# Patient Record
Sex: FEMALE | Race: WHITE | Employment: OTHER | URBAN - METROPOLITAN AREA
[De-identification: names, ages, dates, MRNs, and addresses within clinical notes are randomized per-mention and may not be internally consistent; named-entity substitution may affect disease eponyms.]

---

## 2017-04-10 RX ORDER — ATORVASTATIN CALCIUM 20 MG/1
TABLET, FILM COATED ORAL
Qty: 30 TABLET | Refills: 0 | Status: SHIPPED | OUTPATIENT
Start: 2017-04-10 | End: 2017-05-10 | Stop reason: SDUPTHER

## 2017-05-10 RX ORDER — ATORVASTATIN CALCIUM 20 MG/1
TABLET, FILM COATED ORAL
Qty: 30 TABLET | Refills: 2 | Status: SHIPPED | OUTPATIENT
Start: 2017-05-10 | End: 2017-08-28 | Stop reason: SDUPTHER

## 2017-06-19 PROBLEM — G43.719 INTRACTABLE CHRONIC MIGRAINE WITHOUT AURA AND WITHOUT STATUS MIGRAINOSUS: Status: ACTIVE | Noted: 2017-06-19

## 2017-06-19 PROBLEM — M54.2 CERVICALGIA: Status: ACTIVE | Noted: 2017-06-19

## 2017-07-13 ENCOUNTER — OFFICE VISIT (OUTPATIENT)
Dept: FAMILY MEDICINE CLINIC | Age: 67
End: 2017-07-13

## 2017-07-13 VITALS
DIASTOLIC BLOOD PRESSURE: 80 MMHG | BODY MASS INDEX: 24.48 KG/M2 | HEART RATE: 73 BPM | SYSTOLIC BLOOD PRESSURE: 112 MMHG | WEIGHT: 142.6 LBS | OXYGEN SATURATION: 98 %

## 2017-07-13 DIAGNOSIS — Z11.59 NEED FOR HEPATITIS C SCREENING TEST: ICD-10-CM

## 2017-07-13 DIAGNOSIS — M54.2 NECK PAIN: Primary | ICD-10-CM

## 2017-07-13 PROCEDURE — 3014F SCREEN MAMMO DOC REV: CPT | Performed by: FAMILY MEDICINE

## 2017-07-13 PROCEDURE — 1036F TOBACCO NON-USER: CPT | Performed by: FAMILY MEDICINE

## 2017-07-13 PROCEDURE — 99213 OFFICE O/P EST LOW 20 MIN: CPT | Performed by: FAMILY MEDICINE

## 2017-07-13 PROCEDURE — G8399 PT W/DXA RESULTS DOCUMENT: HCPCS | Performed by: FAMILY MEDICINE

## 2017-07-13 PROCEDURE — G8420 CALC BMI NORM PARAMETERS: HCPCS | Performed by: FAMILY MEDICINE

## 2017-07-13 PROCEDURE — 4040F PNEUMOC VAC/ADMIN/RCVD: CPT | Performed by: FAMILY MEDICINE

## 2017-07-13 PROCEDURE — G8427 DOCREV CUR MEDS BY ELIG CLIN: HCPCS | Performed by: FAMILY MEDICINE

## 2017-07-13 PROCEDURE — 3017F COLORECTAL CA SCREEN DOC REV: CPT | Performed by: FAMILY MEDICINE

## 2017-07-13 PROCEDURE — 1123F ACP DISCUSS/DSCN MKR DOCD: CPT | Performed by: FAMILY MEDICINE

## 2017-07-13 PROCEDURE — 1090F PRES/ABSN URINE INCON ASSESS: CPT | Performed by: FAMILY MEDICINE

## 2017-07-13 ASSESSMENT — PATIENT HEALTH QUESTIONNAIRE - PHQ9
SUM OF ALL RESPONSES TO PHQ QUESTIONS 1-9: 1
SUM OF ALL RESPONSES TO PHQ9 QUESTIONS 1 & 2: 1
1. LITTLE INTEREST OR PLEASURE IN DOING THINGS: 0
2. FEELING DOWN, DEPRESSED OR HOPELESS: 1

## 2017-07-14 ENCOUNTER — HOSPITAL ENCOUNTER (OUTPATIENT)
Dept: OTHER | Age: 67
Discharge: HOME | End: 2017-07-14
Attending: FAMILY MEDICINE | Admitting: FAMILY MEDICINE

## 2017-07-14 DIAGNOSIS — M54.2 NECK PAIN: ICD-10-CM

## 2017-07-14 LAB — HEPATITIS C ANTIBODY INTERPRETATION: NORMAL

## 2017-07-22 ENCOUNTER — HOSPITAL ENCOUNTER (OUTPATIENT)
Dept: MRI IMAGING | Age: 67
Discharge: HOME | End: 2017-07-22
Attending: ORTHOPAEDIC SURGERY | Admitting: ORTHOPAEDIC SURGERY

## 2017-07-22 DIAGNOSIS — M48.02 CERVICAL SPINAL STENOSIS: ICD-10-CM

## 2017-07-22 DIAGNOSIS — M50.30 DDD (DEGENERATIVE DISC DISEASE), CERVICAL: ICD-10-CM

## 2017-07-22 DIAGNOSIS — M50.30 OTHER CERVICAL DISC DEGENERATION, UNSPECIFIED CERVICAL REGION: ICD-10-CM

## 2017-08-14 DIAGNOSIS — E03.9 HYPOTHYROIDISM, UNSPECIFIED TYPE: ICD-10-CM

## 2017-08-15 RX ORDER — ATORVASTATIN CALCIUM 20 MG/1
TABLET, FILM COATED ORAL
Qty: 30 TABLET | Refills: 0 | OUTPATIENT
Start: 2017-08-15

## 2017-08-15 RX ORDER — LEVOTHYROXINE SODIUM 0.05 MG/1
TABLET ORAL
Qty: 90 TABLET | Refills: 0 | OUTPATIENT
Start: 2017-08-15

## 2017-08-16 ENCOUNTER — TELEPHONE (OUTPATIENT)
Dept: FAMILY MEDICINE CLINIC | Age: 67
End: 2017-08-16

## 2017-08-16 DIAGNOSIS — E03.9 HYPOTHYROIDISM, UNSPECIFIED TYPE: ICD-10-CM

## 2017-08-16 RX ORDER — LEVOTHYROXINE SODIUM 0.05 MG/1
TABLET ORAL
Qty: 30 TABLET | Refills: 0 | Status: SHIPPED | OUTPATIENT
Start: 2017-08-16 | End: 2017-08-28 | Stop reason: SDUPTHER

## 2017-08-28 ENCOUNTER — OFFICE VISIT (OUTPATIENT)
Dept: FAMILY MEDICINE CLINIC | Age: 67
End: 2017-08-28

## 2017-08-28 VITALS
SYSTOLIC BLOOD PRESSURE: 110 MMHG | HEART RATE: 75 BPM | OXYGEN SATURATION: 98 % | WEIGHT: 144.2 LBS | DIASTOLIC BLOOD PRESSURE: 72 MMHG | BODY MASS INDEX: 24.75 KG/M2

## 2017-08-28 DIAGNOSIS — G43.909 MIGRAINE WITHOUT STATUS MIGRAINOSUS, NOT INTRACTABLE, UNSPECIFIED MIGRAINE TYPE: ICD-10-CM

## 2017-08-28 DIAGNOSIS — E78.5 HYPERLIPIDEMIA, UNSPECIFIED HYPERLIPIDEMIA TYPE: Primary | ICD-10-CM

## 2017-08-28 DIAGNOSIS — E03.9 HYPOTHYROIDISM, UNSPECIFIED TYPE: ICD-10-CM

## 2017-08-28 PROCEDURE — G8420 CALC BMI NORM PARAMETERS: HCPCS | Performed by: FAMILY MEDICINE

## 2017-08-28 PROCEDURE — 4040F PNEUMOC VAC/ADMIN/RCVD: CPT | Performed by: FAMILY MEDICINE

## 2017-08-28 PROCEDURE — 3017F COLORECTAL CA SCREEN DOC REV: CPT | Performed by: FAMILY MEDICINE

## 2017-08-28 PROCEDURE — 99214 OFFICE O/P EST MOD 30 MIN: CPT | Performed by: FAMILY MEDICINE

## 2017-08-28 PROCEDURE — 1123F ACP DISCUSS/DSCN MKR DOCD: CPT | Performed by: FAMILY MEDICINE

## 2017-08-28 PROCEDURE — 1090F PRES/ABSN URINE INCON ASSESS: CPT | Performed by: FAMILY MEDICINE

## 2017-08-28 PROCEDURE — G8427 DOCREV CUR MEDS BY ELIG CLIN: HCPCS | Performed by: FAMILY MEDICINE

## 2017-08-28 PROCEDURE — G8399 PT W/DXA RESULTS DOCUMENT: HCPCS | Performed by: FAMILY MEDICINE

## 2017-08-28 PROCEDURE — 1036F TOBACCO NON-USER: CPT | Performed by: FAMILY MEDICINE

## 2017-08-28 PROCEDURE — 3014F SCREEN MAMMO DOC REV: CPT | Performed by: FAMILY MEDICINE

## 2017-08-28 RX ORDER — ATORVASTATIN CALCIUM 20 MG/1
TABLET, FILM COATED ORAL
Qty: 90 TABLET | Refills: 3 | Status: SHIPPED | OUTPATIENT
Start: 2017-08-28 | End: 2018-09-16 | Stop reason: SDUPTHER

## 2017-08-28 RX ORDER — SUMATRIPTAN 100 MG/1
TABLET, FILM COATED ORAL
Qty: 9 TABLET | Refills: 11 | Status: SHIPPED | OUTPATIENT
Start: 2017-08-28

## 2017-08-28 RX ORDER — TIZANIDINE 2 MG/1
TABLET ORAL
Refills: 0 | COMMUNITY
Start: 2017-08-16 | End: 2018-07-21 | Stop reason: ALTCHOICE

## 2017-08-28 RX ORDER — LEVOTHYROXINE SODIUM 0.05 MG/1
TABLET ORAL
Qty: 90 TABLET | Refills: 3 | Status: SHIPPED | OUTPATIENT
Start: 2017-08-28 | End: 2018-08-22 | Stop reason: SDUPTHER

## 2017-08-29 ENCOUNTER — HOSPITAL ENCOUNTER (OUTPATIENT)
Dept: OTHER | Age: 67
Discharge: HOME | End: 2017-08-29
Attending: FAMILY MEDICINE | Admitting: FAMILY MEDICINE

## 2017-08-29 LAB
A/G RATIO: 1.6 (ref 1.1–2.2)
ALBUMIN SERPL-MCNC: 4.2 G/DL (ref 3.4–5)
ALP BLD-CCNC: 85 U/L (ref 40–129)
ALT SERPL-CCNC: 15 U/L (ref 10–40)
ANION GAP SERPL CALCULATED.3IONS-SCNC: 13 MMOL/L (ref 3–16)
AST SERPL-CCNC: 18 U/L (ref 15–37)
BILIRUB SERPL-MCNC: 0.4 MG/DL (ref 0–1)
BUN BLDV-MCNC: 13 MG/DL (ref 7–20)
CALCIUM SERPL-MCNC: 9 MG/DL (ref 8.3–10.6)
CHLORIDE BLD-SCNC: 107 MMOL/L (ref 99–110)
CHOLESTEROL, TOTAL: 182 MG/DL (ref 0–199)
CO2: 22 MMOL/L (ref 21–32)
CREAT SERPL-MCNC: 0.7 MG/DL (ref 0.6–1.2)
GFR AFRICAN AMERICAN: >60
GFR NON-AFRICAN AMERICAN: >60
GLOBULIN: 2.6 G/DL
GLUCOSE BLD-MCNC: 94 MG/DL (ref 70–99)
HDLC SERPL-MCNC: 54 MG/DL (ref 40–60)
LDL CHOLESTEROL CALCULATED: 109 MG/DL
POTASSIUM SERPL-SCNC: 4.1 MMOL/L (ref 3.5–5.1)
SODIUM BLD-SCNC: 142 MMOL/L (ref 136–145)
TOTAL PROTEIN: 6.8 G/DL (ref 6.4–8.2)
TRIGL SERPL-MCNC: 94 MG/DL (ref 0–150)
TSH SERPL DL<=0.05 MIU/L-ACNC: 0.94 UIU/ML (ref 0.27–4.2)
VLDLC SERPL CALC-MCNC: 19 MG/DL

## 2017-11-05 DIAGNOSIS — N95.9 POSTMENOPAUSAL SYMPTOMS: ICD-10-CM

## 2017-11-06 RX ORDER — CONJUGATED ESTROGENS 0.3 MG/1
TABLET, FILM COATED ORAL
Qty: 24 TABLET | Refills: 0 | Status: SHIPPED | OUTPATIENT
Start: 2017-11-06 | End: 2018-01-28 | Stop reason: SDUPTHER

## 2018-01-28 DIAGNOSIS — N95.9 POSTMENOPAUSAL SYMPTOMS: ICD-10-CM

## 2018-01-31 RX ORDER — CONJUGATED ESTROGENS 0.3 MG/1
TABLET, FILM COATED ORAL
Qty: 24 TABLET | Refills: 0 | Status: SHIPPED | OUTPATIENT
Start: 2018-01-31 | End: 2018-04-24 | Stop reason: SDUPTHER

## 2018-04-24 DIAGNOSIS — N95.9 POSTMENOPAUSAL SYMPTOMS: ICD-10-CM

## 2018-04-25 RX ORDER — CONJUGATED ESTROGENS 0.3 MG/1
TABLET, FILM COATED ORAL
Qty: 24 TABLET | Refills: 0 | Status: SHIPPED | OUTPATIENT
Start: 2018-04-25 | End: 2018-07-07 | Stop reason: SDUPTHER

## 2018-05-10 ENCOUNTER — TELEPHONE (OUTPATIENT)
Dept: FAMILY MEDICINE CLINIC | Age: 68
End: 2018-05-10

## 2018-07-02 DIAGNOSIS — J45.20 MILD INTERMITTENT ASTHMA WITHOUT COMPLICATION: ICD-10-CM

## 2018-07-02 RX ORDER — ALBUTEROL SULFATE 90 UG/1
2 AEROSOL, METERED RESPIRATORY (INHALATION) EVERY 6 HOURS PRN
Qty: 1 INHALER | Refills: 0 | Status: SHIPPED | OUTPATIENT
Start: 2018-07-02 | End: 2018-08-12 | Stop reason: SDUPTHER

## 2018-07-03 ENCOUNTER — TELEPHONE (OUTPATIENT)
Dept: FAMILY MEDICINE CLINIC | Age: 68
End: 2018-07-03

## 2018-07-03 NOTE — TELEPHONE ENCOUNTER
Pt called to schedule an appt for follow up thyroid/ med check per message from the pharmacy. Please call Pt to discuss scheduling with Dr. Soto Buitrago.

## 2018-07-07 DIAGNOSIS — N95.9 POSTMENOPAUSAL SYMPTOMS: ICD-10-CM

## 2018-07-07 RX ORDER — CONJUGATED ESTROGENS 0.3 MG/1
TABLET, FILM COATED ORAL
Qty: 24 TABLET | Refills: 0 | Status: SHIPPED | OUTPATIENT
Start: 2018-07-07 | End: 2018-10-05 | Stop reason: SDUPTHER

## 2018-07-21 ENCOUNTER — HOSPITAL ENCOUNTER (OUTPATIENT)
Dept: OTHER | Age: 68
Discharge: HOME | End: 2018-07-21
Attending: FAMILY MEDICINE | Admitting: FAMILY MEDICINE

## 2018-07-21 ENCOUNTER — OFFICE VISIT (OUTPATIENT)
Dept: FAMILY MEDICINE CLINIC | Age: 68
End: 2018-07-21

## 2018-07-21 VITALS
BODY MASS INDEX: 23.72 KG/M2 | HEART RATE: 85 BPM | OXYGEN SATURATION: 97 % | DIASTOLIC BLOOD PRESSURE: 62 MMHG | WEIGHT: 142.4 LBS | SYSTOLIC BLOOD PRESSURE: 96 MMHG | HEIGHT: 65 IN

## 2018-07-21 DIAGNOSIS — Z12.39 SCREENING FOR BREAST CANCER: ICD-10-CM

## 2018-07-21 DIAGNOSIS — E03.9 HYPOTHYROIDISM, UNSPECIFIED TYPE: ICD-10-CM

## 2018-07-21 DIAGNOSIS — E04.2 MULTIPLE THYROID NODULES: ICD-10-CM

## 2018-07-21 DIAGNOSIS — J45.909 UNCOMPLICATED ASTHMA, UNSPECIFIED ASTHMA SEVERITY, UNSPECIFIED WHETHER PERSISTENT: ICD-10-CM

## 2018-07-21 DIAGNOSIS — E03.9 HYPOTHYROIDISM, UNSPECIFIED TYPE: Primary | ICD-10-CM

## 2018-07-21 DIAGNOSIS — E78.5 HYPERLIPIDEMIA, UNSPECIFIED HYPERLIPIDEMIA TYPE: ICD-10-CM

## 2018-07-21 DIAGNOSIS — M85.80 OSTEOPENIA, UNSPECIFIED LOCATION: ICD-10-CM

## 2018-07-21 LAB
A/G RATIO: 1.7 (ref 1.1–2.2)
ALBUMIN SERPL-MCNC: 4.5 G/DL (ref 3.4–5)
ALP BLD-CCNC: 75 U/L (ref 40–129)
ALT SERPL-CCNC: 13 U/L (ref 10–40)
ANION GAP SERPL CALCULATED.3IONS-SCNC: 12 MMOL/L (ref 3–16)
AST SERPL-CCNC: 19 U/L (ref 15–37)
BILIRUB SERPL-MCNC: 0.5 MG/DL (ref 0–1)
BUN BLDV-MCNC: 13 MG/DL (ref 7–20)
CALCIUM SERPL-MCNC: 9.5 MG/DL (ref 8.3–10.6)
CHLORIDE BLD-SCNC: 105 MMOL/L (ref 99–110)
CO2: 24 MMOL/L (ref 21–32)
CREAT SERPL-MCNC: 0.7 MG/DL (ref 0.6–1.2)
GFR AFRICAN AMERICAN: >60
GFR NON-AFRICAN AMERICAN: >60
GLOBULIN: 2.7 G/DL
GLUCOSE BLD-MCNC: 91 MG/DL (ref 70–99)
POTASSIUM SERPL-SCNC: 4.4 MMOL/L (ref 3.5–5.1)
SODIUM BLD-SCNC: 141 MMOL/L (ref 136–145)
TOTAL PROTEIN: 7.2 G/DL (ref 6.4–8.2)
TSH REFLEX: 0.84 UIU/ML (ref 0.27–4.2)

## 2018-07-21 PROCEDURE — G8399 PT W/DXA RESULTS DOCUMENT: HCPCS | Performed by: FAMILY MEDICINE

## 2018-07-21 PROCEDURE — 1036F TOBACCO NON-USER: CPT | Performed by: FAMILY MEDICINE

## 2018-07-21 PROCEDURE — G8427 DOCREV CUR MEDS BY ELIG CLIN: HCPCS | Performed by: FAMILY MEDICINE

## 2018-07-21 PROCEDURE — 3017F COLORECTAL CA SCREEN DOC REV: CPT | Performed by: FAMILY MEDICINE

## 2018-07-21 PROCEDURE — 3288F FALL RISK ASSESSMENT DOCD: CPT | Performed by: FAMILY MEDICINE

## 2018-07-21 PROCEDURE — 99214 OFFICE O/P EST MOD 30 MIN: CPT | Performed by: FAMILY MEDICINE

## 2018-07-21 PROCEDURE — 1123F ACP DISCUSS/DSCN MKR DOCD: CPT | Performed by: FAMILY MEDICINE

## 2018-07-21 PROCEDURE — 1090F PRES/ABSN URINE INCON ASSESS: CPT | Performed by: FAMILY MEDICINE

## 2018-07-21 PROCEDURE — G8510 SCR DEP NEG, NO PLAN REQD: HCPCS | Performed by: FAMILY MEDICINE

## 2018-07-21 PROCEDURE — G8420 CALC BMI NORM PARAMETERS: HCPCS | Performed by: FAMILY MEDICINE

## 2018-07-21 PROCEDURE — 4040F PNEUMOC VAC/ADMIN/RCVD: CPT | Performed by: FAMILY MEDICINE

## 2018-07-21 PROCEDURE — 1101F PT FALLS ASSESS-DOCD LE1/YR: CPT | Performed by: FAMILY MEDICINE

## 2018-07-21 RX ORDER — FLUTICASONE PROPIONATE 220 UG/1
1 AEROSOL, METERED RESPIRATORY (INHALATION) 2 TIMES DAILY
Qty: 1 INHALER | Refills: 3 | Status: SHIPPED | OUTPATIENT
Start: 2018-07-21

## 2018-07-21 ASSESSMENT — PATIENT HEALTH QUESTIONNAIRE - PHQ9
1. LITTLE INTEREST OR PLEASURE IN DOING THINGS: 0
SUM OF ALL RESPONSES TO PHQ QUESTIONS 1-9: 0
SUM OF ALL RESPONSES TO PHQ9 QUESTIONS 1 & 2: 0
2. FEELING DOWN, DEPRESSED OR HOPELESS: 0

## 2018-07-21 NOTE — PROGRESS NOTES
thyroid nodules     6. Uncomplicated asthma, unspecified asthma severity, unspecified whether persistent  fluticasone (FLOVENT HFA) 220 MCG/ACT inhaler     Plan:   Check TSH due to history of hypothyroidism. For now, continue levothyroxine at current dose. Check CMP as patient is on statin for lipid control. Repeat liver panel not currently necessary. Bone density scan ordered for history of osteopenia. Mammogram order written. Will see ENT for thyroid nodule follow up. Add Flovent one puff twice a day for asthma which is not well controlled. Hopefully this will help to decrease reliance on albuterol medication. Let us know if she continues to use albuterol more than once or twice a week. Declines shingrix and pneumonia vaccinations. Up-to-date on colonoscopy until 9/2021.

## 2018-08-12 DIAGNOSIS — J45.20 MILD INTERMITTENT ASTHMA WITHOUT COMPLICATION: ICD-10-CM

## 2018-08-17 ENCOUNTER — TELEPHONE (OUTPATIENT)
Dept: FAMILY MEDICINE CLINIC | Age: 68
End: 2018-08-17

## 2018-08-17 DIAGNOSIS — Z78.0 POSTMENOPAUSAL: Primary | ICD-10-CM

## 2018-08-17 NOTE — TELEPHONE ENCOUNTER
[facility] Scheduling state that the ICD 10 code for Dexa Scan does not pass medical necessity for Medicare. Needs a new order w/new code. Please advise     Advised Dr. Atlee Bumpers is out of office today. No appt can be scheduled until new order is sent.

## 2018-08-18 ENCOUNTER — HOSPITAL ENCOUNTER (OUTPATIENT)
Dept: MAMMOGRAPHY | Age: 68
Discharge: HOME | End: 2018-08-18
Attending: FAMILY MEDICINE | Admitting: FAMILY MEDICINE

## 2018-08-18 DIAGNOSIS — Z12.39 SCREENING FOR BREAST CANCER: ICD-10-CM

## 2018-08-18 DIAGNOSIS — M85.80 OTHER SPECIFIED DISORDERS OF BONE DENSITY AND STRUCTURE, UNSPECIFIED SITE: ICD-10-CM

## 2018-08-22 DIAGNOSIS — E03.9 HYPOTHYROIDISM, UNSPECIFIED TYPE: ICD-10-CM

## 2018-08-22 RX ORDER — LEVOTHYROXINE SODIUM 0.05 MG/1
TABLET ORAL
Qty: 90 TABLET | Refills: 0 | Status: SHIPPED | OUTPATIENT
Start: 2018-08-22 | End: 2018-12-01 | Stop reason: SDUPTHER

## 2018-09-04 ENCOUNTER — HOSPITAL ENCOUNTER (OUTPATIENT)
Dept: GENERAL RADIOLOGY | Age: 68
Discharge: HOME | End: 2018-09-04
Attending: FAMILY MEDICINE | Admitting: FAMILY MEDICINE

## 2018-09-04 DIAGNOSIS — Z78.0 ASYMPTOMATIC MENOPAUSAL STATE: ICD-10-CM

## 2018-09-04 DIAGNOSIS — Z78.0 POSTMENOPAUSAL: ICD-10-CM

## 2018-09-17 ENCOUNTER — PATIENT MESSAGE (OUTPATIENT)
Dept: FAMILY MEDICINE CLINIC | Age: 68
End: 2018-09-17

## 2018-09-17 RX ORDER — ATORVASTATIN CALCIUM 20 MG/1
TABLET, FILM COATED ORAL
Qty: 90 TABLET | Refills: 0 | Status: SHIPPED | OUTPATIENT
Start: 2018-09-17 | End: 2018-12-21 | Stop reason: SDUPTHER

## 2018-09-19 NOTE — TELEPHONE ENCOUNTER
From: Brian Anguiano  To: Aline Martin MD  Sent: 9/17/2018 5:17 PM EDT  Subject: Non-Urgent Medical Question    Dr. Michael Plascencia  This is not a question but wanted you to know I have contacted [facility] to have my Elex Downy filled through them since I cannot afford to pay to have it filled at [facility]. (Just like the previous inhaler we cancelled.) I am having my Premarin fill through them, also. They will be getting in touch with you regarding these two prescriptions. Have a good week!   Tonie Perdomo

## 2018-10-05 DIAGNOSIS — N95.9 POSTMENOPAUSAL SYMPTOMS: ICD-10-CM

## 2018-10-09 ENCOUNTER — TELEPHONE (OUTPATIENT)
Dept: FAMILY MEDICINE CLINIC | Age: 68
End: 2018-10-09

## 2018-12-01 DIAGNOSIS — E03.9 HYPOTHYROIDISM, UNSPECIFIED TYPE: ICD-10-CM

## 2018-12-03 RX ORDER — LEVOTHYROXINE SODIUM 0.05 MG/1
TABLET ORAL
Qty: 90 TABLET | Refills: 0 | Status: SHIPPED | OUTPATIENT
Start: 2018-12-03 | End: 2019-02-25 | Stop reason: SDUPTHER

## 2018-12-21 RX ORDER — ATORVASTATIN CALCIUM 20 MG/1
TABLET, FILM COATED ORAL
Qty: 90 TABLET | Refills: 1 | Status: SHIPPED | OUTPATIENT
Start: 2018-12-21 | End: 2019-06-13 | Stop reason: SDUPTHER

## 2019-02-25 DIAGNOSIS — E03.9 HYPOTHYROIDISM, UNSPECIFIED TYPE: ICD-10-CM

## 2019-02-26 RX ORDER — LEVOTHYROXINE SODIUM 0.05 MG/1
TABLET ORAL
Qty: 90 TABLET | Refills: 1 | Status: SHIPPED | OUTPATIENT
Start: 2019-02-26

## 2019-05-07 ENCOUNTER — OFFICE VISIT (OUTPATIENT)
Dept: FAMILY MEDICINE CLINIC | Age: 69
End: 2019-05-07
Payer: MEDICARE

## 2019-05-07 VITALS
SYSTOLIC BLOOD PRESSURE: 100 MMHG | TEMPERATURE: 98.6 F | OXYGEN SATURATION: 98 % | BODY MASS INDEX: 24.65 KG/M2 | DIASTOLIC BLOOD PRESSURE: 70 MMHG | HEART RATE: 88 BPM | WEIGHT: 147 LBS

## 2019-05-07 DIAGNOSIS — J37.0 CHRONIC LARYNGITIS: Primary | ICD-10-CM

## 2019-05-07 DIAGNOSIS — R13.10 DYSPHAGIA, UNSPECIFIED TYPE: ICD-10-CM

## 2019-05-07 DIAGNOSIS — H65.05 RECURRENT ACUTE SEROUS OTITIS MEDIA OF LEFT EAR: ICD-10-CM

## 2019-05-07 DIAGNOSIS — H72.92 PERFORATION OF LEFT TYMPANIC MEMBRANE: ICD-10-CM

## 2019-05-07 PROCEDURE — G8427 DOCREV CUR MEDS BY ELIG CLIN: HCPCS | Performed by: PHYSICIAN ASSISTANT

## 2019-05-07 PROCEDURE — 1036F TOBACCO NON-USER: CPT | Performed by: PHYSICIAN ASSISTANT

## 2019-05-07 PROCEDURE — 3017F COLORECTAL CA SCREEN DOC REV: CPT | Performed by: PHYSICIAN ASSISTANT

## 2019-05-07 PROCEDURE — 99214 OFFICE O/P EST MOD 30 MIN: CPT | Performed by: PHYSICIAN ASSISTANT

## 2019-05-07 PROCEDURE — G8399 PT W/DXA RESULTS DOCUMENT: HCPCS | Performed by: PHYSICIAN ASSISTANT

## 2019-05-07 PROCEDURE — G8420 CALC BMI NORM PARAMETERS: HCPCS | Performed by: PHYSICIAN ASSISTANT

## 2019-05-07 PROCEDURE — 1123F ACP DISCUSS/DSCN MKR DOCD: CPT | Performed by: PHYSICIAN ASSISTANT

## 2019-05-07 PROCEDURE — 4040F PNEUMOC VAC/ADMIN/RCVD: CPT | Performed by: PHYSICIAN ASSISTANT

## 2019-05-07 PROCEDURE — 1090F PRES/ABSN URINE INCON ASSESS: CPT | Performed by: PHYSICIAN ASSISTANT

## 2019-05-07 RX ORDER — AZITHROMYCIN 250 MG/1
TABLET, FILM COATED ORAL
Qty: 1 PACKET | Refills: 0 | Status: SHIPPED | OUTPATIENT
Start: 2019-05-07 | End: 2020-05-15

## 2019-05-07 ASSESSMENT — ENCOUNTER SYMPTOMS
VOICE CHANGE: 1
EYE PAIN: 0
TROUBLE SWALLOWING: 1
NAUSEA: 0
COUGH: 0
SORE THROAT: 0
WHEEZING: 0
VOMITING: 0
RHINORRHEA: 0
DIARRHEA: 0

## 2019-05-07 ASSESSMENT — PATIENT HEALTH QUESTIONNAIRE - PHQ9
SUM OF ALL RESPONSES TO PHQ QUESTIONS 1-9: 0
2. FEELING DOWN, DEPRESSED OR HOPELESS: 0
1. LITTLE INTEREST OR PLEASURE IN DOING THINGS: 0
SUM OF ALL RESPONSES TO PHQ9 QUESTIONS 1 & 2: 0
SUM OF ALL RESPONSES TO PHQ QUESTIONS 1-9: 0

## 2019-05-07 NOTE — PATIENT INSTRUCTIONS
ENT Specialists:    Dr. Cynthia Dawn  [phone]  [address]    Dr. Erica Garcia  [address] [facility], [address]    Dr. Denese Lesch Dr. Caye Rist  1 [facility] [address] [facility], . [address]   [phone]    Inezcam Estrada, [address], MD  [facility]  [phone] [facility])  [phone] ([place] [address])    1 [facility])  [address] [facility] [address]  [facility], [address]   Phone: [phone]        Antonio Officer was seen today for congestion, cough and neck pain. Diagnoses and all orders for this visit:    Chronic laryngitis    Recurrent acute serous otitis media of left ear  -     azithromycin (ZITHROMAX) 250 MG tablet; Take 2 tabs po today then 1 daily x 4 days    Perforation of left tympanic membrane    Dysphagia, unspecified type       See ENT for laryngitis, chronic otitis serous and dysphagia.

## 2019-05-07 NOTE — PROGRESS NOTES
Subjective:      Patient ID: Stephen Gregorio is a 76 y.o. female. HPI Patient is here today with at least a 1-2 month history of laryngitis. No post nasal drip, cough, ST. She does not feel sickly. She does also feel like her neck is swollen where her thyroid is. She had 3/4 of her thyroid removed in 2010 by Dr. Justin Hebert. She is not sure he takes her insurance anymore. She had multiple nodules. They were not cancerous. She has choked twice on food lately. She has a difficult time swallowing everything. She has a perforated left TM and always feels like there is something in there. Review of Systems   Constitutional: Negative for appetite change, chills, fatigue and fever. HENT: Positive for ear pain, hearing loss, trouble swallowing and voice change. Negative for congestion, postnasal drip, rhinorrhea and sore throat. Eyes: Negative for pain. Respiratory: Negative for cough and wheezing. Gastrointestinal: Negative for diarrhea, nausea and vomiting. Musculoskeletal: Negative for myalgias. Skin: Negative for rash. Neurological: Negative for dizziness and headaches. Psychiatric/Behavioral: Negative for sleep disturbance. Objective:   Physical Exam   Constitutional: She is oriented to person, place, and time. Vital signs are normal. She appears well-developed and well-nourished. She is cooperative. HENT:   Head: Normocephalic. Right Ear: Tympanic membrane and ear canal normal.   Left Ear: Ear canal normal.   Nose: Mucosal edema and rhinorrhea present. Right sinus exhibits no maxillary sinus tenderness and no frontal sinus tenderness. Left sinus exhibits no maxillary sinus tenderness and no frontal sinus tenderness. Mouth/Throat: Oropharynx is clear and moist and mucous membranes are normal.   Left TM perforation, slightly erythemetous, fluid behind left TM   Neck: Neck supple. No thyromegaly present. Cardiovascular: Normal rate, regular rhythm and normal heart sounds. Pulmonary/Chest: Effort normal and breath sounds normal. No respiratory distress. She has no decreased breath sounds. She has no wheezes. She has no rhonchi. She has no rales. Lymphadenopathy:     She has no cervical adenopathy. Neurological: She is alert and oriented to person, place, and time. Assessment / Plan:         Kenia Cancino was seen today for congestion, cough and neck pain. Diagnoses and all orders for this visit:    Chronic laryngitis    Recurrent acute serous otitis media of left ear  -     azithromycin (ZITHROMAX) 250 MG tablet; Take 2 tabs po today then 1 daily x 4 days    Perforation of left tympanic membrane    Dysphagia, unspecified type       Treat otitis serous, see ENT for thyroid and laryngitis.

## 2019-05-20 ENCOUNTER — OFFICE VISIT (OUTPATIENT)
Dept: ENT CLINIC | Age: 69
End: 2019-05-20
Payer: MEDICARE

## 2019-05-20 VITALS
HEART RATE: 72 BPM | WEIGHT: 146 LBS | BODY MASS INDEX: 24.48 KG/M2 | DIASTOLIC BLOOD PRESSURE: 66 MMHG | SYSTOLIC BLOOD PRESSURE: 111 MMHG

## 2019-05-20 DIAGNOSIS — J30.9 ALLERGIC RHINITIS, UNSPECIFIED SEASONALITY, UNSPECIFIED TRIGGER: ICD-10-CM

## 2019-05-20 DIAGNOSIS — E04.1 THYROID NODULE: ICD-10-CM

## 2019-05-20 DIAGNOSIS — R49.0 HOARSENESS OF VOICE: Primary | ICD-10-CM

## 2019-05-20 DIAGNOSIS — H93.13 SUBJECTIVE TINNITUS OF BOTH EARS: ICD-10-CM

## 2019-05-20 DIAGNOSIS — E04.9 GOITER: ICD-10-CM

## 2019-05-20 PROCEDURE — 1090F PRES/ABSN URINE INCON ASSESS: CPT | Performed by: OTOLARYNGOLOGY

## 2019-05-20 PROCEDURE — G8399 PT W/DXA RESULTS DOCUMENT: HCPCS | Performed by: OTOLARYNGOLOGY

## 2019-05-20 PROCEDURE — G8420 CALC BMI NORM PARAMETERS: HCPCS | Performed by: OTOLARYNGOLOGY

## 2019-05-20 PROCEDURE — 3017F COLORECTAL CA SCREEN DOC REV: CPT | Performed by: OTOLARYNGOLOGY

## 2019-05-20 PROCEDURE — 99204 OFFICE O/P NEW MOD 45 MIN: CPT | Performed by: OTOLARYNGOLOGY

## 2019-05-20 PROCEDURE — 1123F ACP DISCUSS/DSCN MKR DOCD: CPT | Performed by: OTOLARYNGOLOGY

## 2019-05-20 PROCEDURE — 4040F PNEUMOC VAC/ADMIN/RCVD: CPT | Performed by: OTOLARYNGOLOGY

## 2019-05-20 PROCEDURE — G8427 DOCREV CUR MEDS BY ELIG CLIN: HCPCS | Performed by: OTOLARYNGOLOGY

## 2019-05-20 PROCEDURE — 1036F TOBACCO NON-USER: CPT | Performed by: OTOLARYNGOLOGY

## 2019-05-20 NOTE — PROGRESS NOTES
[facility]       NEW PATIENT VISIT    PCP:  Zeferino Palmer MD    REFERRED BY:   self      CHIEF COMPLAINT:  Chief Complaint   Patient presents with    Thyroid Problem       HISTORY OF PRESENT ILLNESS:       (Location, Quality, Severity, Duration, Timing, Context, Modifying factors, Associated symptoms)  Rosalio Da Silva is a 71 y.o. female here for evaluation and treatment of a problem located in the throat. The quality is hoarseness. The severity is mild. The duration is 4-5 months. The timing is constant. The context is development of hoarseness after antecedent swelling in the right neck. Modifying factors include none. Associated symptoms include feels a lump in the throat, dysphagia, swelling of neck, and ear pressure. Was doing well since left hemithyroidectomy and subtotal right thyroidectomy onMay 27, 2010. Until January 2019, \"I started noticing my neck swollen and puffy, I started losing my voice, had hoarseness. My left ear, my bad ear with the hole in it started hurting and wouldn't pop, felt like something was in there. I decided to wait until got home from [place]. I saw my doctor on May 7. She said my neck was puffy and ear a little red. \"  Was treated with a 5 days Z-lucía. \"My neck has gone down a little bit. It still feels like I got a lump in my throat and I'm having a hard time swallowing. It feels like a tightness around my throat when I swallow. It feels real tight. \"  Hoarseness had persisted. Stopped smoking in 1984, after 1 ppd for 10 years. REVIEW OF SYSTEMS:    CONSTITUTIONAL:  Denied fever. Denied unexplained weight loss. EARS, NOSE, THROAT:  (+) tinnitus, for 5 years, since I started my migraine medication, no recent changes. No hearing tests in the past five years. Denied otorrhea, otalgia, hearing loss, nasal dyspnea, rhinorrhea, and sore throat. EYES:   Denied double vision and pain.       CARDIOVASCULAR: Denied chest pains. Denied syncope. RESPIRATORY:  Denied dyspnea. Denied chronic cough. GASTROINTESTINAL:  Denied dysphagia. Denied hematemesis. MUSCULOSKELETAL:  Denied pain in joints, arthritis. Denied pain in bones. INTEGUMENTARY (SKIN):  Denied hives. Denied non-healing skin ulcers/lesions. NEUROLOGIC:  (+) h/o Migraine headache. Denied paralysis of any body parts. HEMATOLOGIC/LYMPHATIC:  Denied prolonged and excessive bleeding. Denied enlarged lymph nodes. ALLERGIC/IMMUNOLOGIC:  (+) seasonal or environmental allergies. Denied frequent infections. ENDOCRINE:  (+) thyroid disorders. Denied diabetes.         PAST MEDICAL, FAMILY, AND SOCIAL HISTORY:  Past Medical History:   Diagnosis Date    Asthma     Crohn disease ([facility] [address]     Headache(784.0)     Hyperlipidemia 7/28/2016    Thyroid disease        Past Surgical History:   Procedure Laterality Date    APPENDECTOMY  1993    CHOLECYSTECTOMY  1998    HYSTERECTOMY      Total; Cervical cancer    NECK SURGERY      Cervical fusion    THYROIDECTOMY, PARTIAL  2010    TONSILLECTOMY      TUBAL LIGATION  1979    TYMPANOPLASTY         Family History   Problem Relation Age of Onset    Heart Disease Sister     Cancer Mother         Cervical    Cancer Father         Lung    Alcohol Abuse Father     Other Other         Headache    High Cholesterol Other     Stroke Other     Alcohol Abuse Other     Cancer Other     Cancer Other        Social History     Socioeconomic History    Marital status:      Spouse name: Not on file    Number of children: Not on file    Years of education: Not on file    Highest education level: Not on file   Occupational History    Not on file   Social Needs    Financial resource strain: Not on file    Food insecurity:     Worry: Not on file     Inability: Not on file    Transportation needs:     Medical: Not on file     Non-medical: Not on file   Tobacco Use    Smoking status: Former Smoker     Packs/day: 1.00     Years: 17.00     Pack years: 17.00     Types: Cigarettes     Last attempt to quit: 1985     Years since quittin.4    Smokeless tobacco: Never Used    Tobacco comment: quit in    Substance and Sexual Activity    Alcohol use: No    Drug use: No    Sexual activity: Yes     Partners: Male   Lifestyle    Physical activity:     Days per week: Not on file     Minutes per session: Not on file    Stress: Not on file   Relationships    Social connections:     Talks on phone: Not on file     Gets together: Not on file     Attends Latter-day service: Not on file     Active member of club or organization: Not on file     Attends meetings of clubs or organizations: Not on file     Relationship status: Not on file    Intimate partner violence:     Fear of current or ex partner: Not on file     Emotionally abused: Not on file     Physically abused: Not on file     Forced sexual activity: Not on file   Other Topics Concern    Not on file   Social History Narrative    . Two children. EXAMINATION, COMPREHENSIVE:       VITALS SIGNS reviewed. Vitals:    19 0937   BP: 111/66   Pulse: 72     GENERAL APPEARANCE:  Well developed, well nourished, no apparent distress, no apparent deformities. ABILITY TO COMMUNICATE/QUALITY OF VOICE:  Communicated without difficulty. Normal voice. INSPECTION:  Normocephalic. No evidence of trauma. No tenderness. Normal overall appearance with no scars, lesions or masses. SINUSES:  The maxillary and frontal sinuses were nontender, bilaterally. SALIVARY GLANDS:  Parotid, submandibular and sublingual glands normal.    FACIAL STRENGTH, MOTION:  Normal and equal for all five branches bilaterally. EXTRAOCULAR MOTION:  intact, normal primary gaze alignment. No nystagmus at any point of gaze. (+) EARS, OTOSCOPY:  Left TM nonerythematous, dull thick with patches of sclerosis diffusely.   The right TM was dull and thick intact with no erythema. HEARING ASSESSMENT:  Able to hear finger rub bilaterally. Tuning fork tests, [address] tuning fork:  Newton left . Rinne air > bone bilaterally. EXTERNAL EAR/NOSE:  Normal pinnae and mastoids. Normal external nose. NOSE:  The nasal septum was mildly deviated to the left. The nasal mucosa, inferior turbinates, and secretions were normal.  No pus or polyps were seen. LIPS, TEETH AND GUMS:  Normal.   (+) OROPHARYNX/ORALCAVITY:  Post tonsillectomy. Oral mucosa, hard and soft palates, tongue, and pharynx were normal.   INSPECTION OF PHARYNGEAL WALLS AND PYRIFORM SINUSES:  Normal with no pooling of saliva asymmetry or lesions. INDIRECT LARYNGOSCOPY, MIRROR EXAMINATION:  Normal.  The epiglottis, false vocal cords, true vocal cords, mobility of the larynx, supraglottis, piriform sinuses, and base of tongue appeared normal.     NASOPHARYNX, MIRROR EXAMINATION:  mucosa, adenoids, posterior choanae, eustachian tube orifices, and torus tubarius appeared to be normal, bilaterally. NECK: No masses or tenderness. No carotid bruits, abnormal appearance, asymmetry or tracheal deviation.      (+) THYROID:  Right goiter versus nodule, mildly tender. No left thyroid. CHEST, INSPECTION:  Normal symmetrical expansion, and respiratory effort. LUNGS, AUSCULTATION:  clear, with normal breath sounds and no rales, rhonchi, or rubs. HEART, AUSCULTATION:  normal S1 and S2. No abnormal sounds or murmurs. No carotid bruits. PALPATION OF LYMPH NODES, CERVICAL, FACIAL AND SUPRACLAVICULAR:  Nontender, No lymphadenopathy. CRANIAL NERVES:  II - XII intact, with no apparent deficits. ORIENTATION TO TIME, PLACE, PRESIDENT, AND PERSON:  Oriented x 3. MOOD AND AFFECT:  Normal.  No evidence of depression, anxiety or agitation.         REVIEW OF OLD RECORDS / HISTORY OBTAINED FROM SOURCE OTHER THAN PATIENT:      Old paper chart, which was scanned into epic on 10/26/2016, was reviewed today, showing

## 2019-05-28 ENCOUNTER — HOSPITAL ENCOUNTER (OUTPATIENT)
Dept: ULTRASOUND IMAGING | Age: 69
Discharge: HOME | End: 2019-05-28
Payer: MEDICARE

## 2019-05-28 ENCOUNTER — PROCEDURE VISIT (OUTPATIENT)
Dept: AUDIOLOGY | Age: 69
End: 2019-05-28
Payer: MEDICARE

## 2019-05-28 DIAGNOSIS — H90.3 SENSORY HEARING LOSS, BILATERAL: ICD-10-CM

## 2019-05-28 DIAGNOSIS — E04.1 THYROID NODULE: ICD-10-CM

## 2019-05-28 DIAGNOSIS — H93.13 TINNITUS OF BOTH EARS: Primary | ICD-10-CM

## 2019-05-28 DIAGNOSIS — E04.9 GOITER: ICD-10-CM

## 2019-05-28 PROCEDURE — 76536 US EXAM OF HEAD AND NECK: CPT

## 2019-05-28 PROCEDURE — 92550 TYMPANOMETRY & REFLEX THRESH: CPT | Performed by: AUDIOLOGIST

## 2019-05-28 PROCEDURE — 92557 COMPREHENSIVE HEARING TEST: CPT | Performed by: AUDIOLOGIST

## 2019-05-28 NOTE — PROGRESS NOTES
Subjective:      Patient ID: Eulalia Aguero is a 76 y.o. female  For evaluation of her hearing. She complains of bilateral tinnitus. Objective:       Assessment:    Audiometric results suggests a high frequency sensorineural hearing loss bilaterally. Normal tympanograms were noted bilaterally. Plan:      Medical follow-up for tinnitus  Hearing retest as needed. See scan audiogram and tympanogram for additional details        EDINSON Brown

## 2019-06-12 PROBLEM — H93.13 SUBJECTIVE TINNITUS OF BOTH EARS: Status: ACTIVE | Noted: 2019-06-12

## 2019-06-12 PROBLEM — J30.9 ALLERGIC RHINITIS: Status: ACTIVE | Noted: 2019-06-12

## 2019-06-12 PROBLEM — E04.1 THYROID NODULE: Status: ACTIVE | Noted: 2019-06-12

## 2019-06-12 PROBLEM — E04.9 GOITER: Status: ACTIVE | Noted: 2019-06-12

## 2019-06-12 PROBLEM — R49.0 HOARSENESS OF VOICE: Status: ACTIVE | Noted: 2019-06-12

## 2019-06-13 RX ORDER — ATORVASTATIN CALCIUM 20 MG/1
TABLET, FILM COATED ORAL
Qty: 90 TABLET | Refills: 0 | Status: SHIPPED | OUTPATIENT
Start: 2019-06-13

## 2019-06-24 ENCOUNTER — OFFICE VISIT (OUTPATIENT)
Dept: ENT CLINIC | Age: 69
End: 2019-06-24
Payer: MEDICARE

## 2019-06-24 VITALS
WEIGHT: 147.1 LBS | SYSTOLIC BLOOD PRESSURE: 121 MMHG | HEART RATE: 93 BPM | DIASTOLIC BLOOD PRESSURE: 75 MMHG | HEIGHT: 64 IN | BODY MASS INDEX: 25.11 KG/M2

## 2019-06-24 DIAGNOSIS — R09.89 GLOBUS PHARYNGEUS: ICD-10-CM

## 2019-06-24 DIAGNOSIS — E04.9 GOITER: ICD-10-CM

## 2019-06-24 DIAGNOSIS — R49.0 HOARSENESS OF VOICE: Primary | ICD-10-CM

## 2019-06-24 DIAGNOSIS — E04.1 THYROID NODULE: ICD-10-CM

## 2019-06-24 PROBLEM — R09.A2 GLOBUS PHARYNGEUS: Status: ACTIVE | Noted: 2019-06-24

## 2019-06-24 PROCEDURE — 31575 DIAGNOSTIC LARYNGOSCOPY: CPT | Performed by: OTOLARYNGOLOGY

## 2019-06-24 NOTE — PROGRESS NOTES
[facility]        PCP:  Baylee Mares MD       CHIEF COMPLAINT:    Chief Complaint   Patient presents with    Hoarse    Thyroid Problem       HISTORY OF PRESENT ILLNESS:   Nadia oCburn is a 71 y.o. Hoarseness is better. Still feels a lump in the throat. Here for recheck and follow-up of thyroid. Since the last visit here, she has been stable with no worsening or new symptoms. Current Outpatient Prescriptions   Medication Sig Dispense Refill    Coenzyme Q10 50 MG CAPS Take 100 mg by mouth      Multiple Vitamin (MULTI VITAMIN PO) Take by mouth      COD LIVER OIL PO Take by mouth daily      losartan-hydrochlorothiazide (HYZAAR) 50-12.5 MG per tablet       vitamin D (CHOLECALCIFEROL) 1000 UNIT TABS tablet Take 1,000 Units by mouth daily       No current facility-administered medications for this visit. EXAMINATION:      VITALS SIGNS reviewed. Vitals:    06/24/19 1318   BP: 121/75   Pulse: 93   Weight: 147 lb 1.6 oz (66.7 kg)   Height: 5' 4\" (1.626 m)     GENERAL APPEARANCE:  Well developed, well nourished, no apparent distress, no apparent deformities. ABILITY TO COMMUNICATE/QUALITY OF VOICE:  Communicated without difficulty. Hoarse breathy voice. PROCEDURE:  FLEXIBLE FIBEROPTIC NASOPHARYNGOLARYNGOSCOPY  INDICATION:  Need for detailed examination of the larynx and pharynx to evaluate upper aerodigestive tract particularly larynx for etiology of hoarseness and globus pharyngeus symptoms. INFORMED CONSENT:  The procedure was described to the patient, including method of anesthesia. The patient was advised of the medical necessity for this procedure. The attendant risks and potential complications were discussed, including, but not limited to bleeding, infection, adverse reaction to medications, hoarseness, sore throat, inability to obtain adequate visualization, and future need for rigid operative endoscopy.   The expected outcome, potential benefits and the alternatives of therapy were discussed. Matt Bench asked appropriate questions and then expressed the lack of any further questions, understanding, acceptance, and the desire to undergo with this procedure, granting verbal informed consent. FINDINGS:  There was mild atrophy of the vocal cords bilaterally with some mild bowing on phonation. The vocal cords appeared to be otherwise normal, with no nodule, ulceration, polyp, leukoplakia or other lesions. The vocal cords appeared to be normally mobile bilaterally with midline approximation on phonation. Sensation of the hypopharynx and larynx appeared to be normal when touched by the end of the flexible scope. The nasopharynx, eustachian tube orifices and fossa of Rosenmüller, oropharynx, base of tongue, hypopharynx, supraglottis, subglottis, and piriform sinuses all appeared to be normal, with no lesions. Visualization was excellent throughout the examination. DESCRIPTION OF PROCEDURE:  The right and left nasal cavity was topically anesthetized and decongested with a 50-50 mixture of 0.05% oxymetazoline solution and topical 4% lidocaine solution by nasal sprayer, twice. After about ten minutes, the Endo-sheath was placed on the flexible fiberoptic nasopharyngolaryngoscope, which then was inserted through the right nare/nasal cavity and advanced to the nasopharynx and then to the hypopharynx and larynx. After adequate endoscopic visualization, the endoscope was removed. The patient appeared to tolerate the procedure well with no evidence of perioperative complications. REVIEW OF IMAGES:       I independently reviewed the images of the thyroid ultrasound, showing right thyroid nodule and goiter.     Narrative   EXAMINATION:   THYROID ULTRASOUND       5/28/2019       COMPARISON:   None.       HISTORY:   ORDERING SYSTEM PROVIDED HISTORY: Goiter   TECHNOLOGIST PROVIDED HISTORY:   Ordering Physician Provided Reason for Exam: hoareness   Acuity: Acute   Type of Exam: Subsequent/Follow-up       FINDINGS:   Right thyroid lobe:  5.5 x 1.7 x 2.3 cm       Left thyroid lobe:  Surgically absent       Isthmus:  3 mm       Thyroid Gland:  The remnant thyroid gland has a normal echogenicity and   texture       Nodules: There are colloid cysts in the right thyroid lobe. Raad Fret is a   dominant nodule in the right thyroid lobe that is predominantly cystic,   measuring up to 2.9 cm.  It is wider than tall with no suspicious   calcifications in the solid component.       Cervical lymphadenopathy: No abnormal lymph nodes in the imaged portions of   the neck.           Impression   Colloid cysts and probably benign dominant right thyroid nodule, status post   left thyroidectomy         IMPRESSION / DIAGNOSES / ORDERS / PROCEDURES:        Diagnosis Orders   1. Hoarseness of voice  [facility] Thyroid   2. Thyroid nodule  [facility]    [place] Thyroid   3. Goiter  [facility] Thyroid   4. Globus pharyngeus           RECOMMENDATIONS / PLAN:    1. Referral to [facility] therapy for evaluation and management of hoarseness, vocal cord atrophy, and mild bowing of vocal cords. 2. Thyroid recheck every six months. 3. Repeat thyroid ultrasound in April 2020. (Patient stated she is going to [place] in October or November and will not be back from [place] until April 2020.)  4. Return in about 4 months (around 10/24/2019) for recheck of thyroid, neck, and vocal cords.

## 2019-06-26 ENCOUNTER — HOSPITAL ENCOUNTER (OUTPATIENT)
Dept: SPEECH THERAPY | Age: 69
Setting detail: THERAPIES SERIES
Discharge: HOME | End: 2019-06-26
Payer: MEDICARE

## 2019-06-26 PROCEDURE — 92524 BEHAVRAL QUALIT ANALYS VOICE: CPT

## 2019-06-26 PROCEDURE — 92507 TX SP LANG VOICE COMM INDIV: CPT

## 2019-06-26 NOTE — PROGRESS NOTES
Speech Language Pathology  Facility/Department: [facility] SPEECH THERAPY  Behavioral Quantitative Analysis of Voice Evalation  Initial Assessment    Patient: Caitlyn Encinas  YOB: 1950   MRN: [id number]  Onset date: March 2019  Admission date: 6/26/2019  Medical Diagnosis:  Hoarseness of voice (R49.0)  Treatment Diagnosis: Moderate Dysphonia  Pain level: 9/51    Insurance/Certification information: Medicare   Plan of care signed (Y/N):  N; Faxed  Visit# / total visits:  1/12      Date of Eval: 6/26/2019  Evaluating Therapist: Reyna Gillis      Past Medical History: has a past medical history of Asthma, Crohn disease ([address] Headache(784.0), Hyperlipidemia, and Thyroid disease. Past Surgical History:  has a past surgical history that includes Hysterectomy; Neck surgery; Thyroidectomy, partial (2010); Appendectomy (1993); Cholecystectomy (1998); Tubal ligation (1979); Tympanoplasty; and Tonsillectomy. History/Prior Level of Function:  Pt is a 71year old female with PMH of asthma, Chron's disease, headaches, hyperlipidemia, and thyroid disease. Pt had left hemithyroidectomy May 2010. Pt saw PCP March 2019 due to \"swelling\" of thyroid. Pt was placed on antibiotics and referred to ENT . ENT completed nasopharyngolaryngoscopy which revealed: mild atrophy of the vocal cords bilaterally with some mild bowing on phonation. Pt referred for outpatient speech therapy for voice evaluation/treatment this date. Pt reports sensation of \"lump\" in her throat and hoarse vocal quality. She reports often having to repeat herself to be understood. Pt drinks sufficient water and does not drink alcohol, caffeine, or smoke. Pt admits to occasional difficulty swallowing, but is okay when she takes her time. No speech-language or cognitive deficits noted per chart review. Pt is retired, lives with her , and enjoys crocheting, reading, and walking.     Primary Complaint: Pt reports hoarse and strained vocal quality that began in March 2019. Pt complains of having to repeat her self to be heard. Per pt, when she talks too much, her throat gets sore and she gets a migraine. Pain:  Pain Assessment  Patient Currently in Pain: Denies    Assessment:  Communication Diagnosis: Moderate Dysphonia  Moderate dysphonia characterized by hoarse, strained, and breathy vocal quality of speech with decreased vocal intensity. Pt was evaluated via quantitative measures including maximum phonation time, s/z ratio, syllables per breath, and vocal intensity. Pt achieved a maximum phonation time of 15 seconds over 3 trials with >15 being normal range. Pt with a s/z ratio of 1.13 with a ratio of >1.00 indicating insufficient glottal closure. Pt's vocal intensity was assessed during conversation to be 56 dB with >75 dB being normal.  Current average loudness indicate mild-moderately reduced vocal intensity. Pt with slight decreased breath support for speech. Pt completed the Voice Handicap Index (VHI) with the follow results:  Physical: 35  Functional: 36  Emotional: 37  Total: 108  Patient score placing patient in severe range of handicap   Pt did report occasional swallowing difficulty when not taking her time. Will monitor and make referral for dysphagia evaluation as clinically indicate. Pt denied speech-language/cognitive-linguistic deficits. Recommend initiate voice therapy with focus on vocal Vocal Function Exercises in order to improve vocal strength and intensity.       Subjective:  General  Chart Reviewed: Yes  Patient assessed for rehabilitation services?: Yes  Additional Pertinent Hx: Pt had nasopharyngolaryngocopy with Dr. Rhae Khan 6/24/19 that revealed mild atrophy of vocal cords and mild bowing on phonation  Family / Caregiver Present: No  Visit Information  Onset Date: 03/01/19  SLP Insurance Information: Medicare  Pain Assessment  Patient Currently in Pain: Denies  Vital Signs  Patient Currently in Pain: Denies  Social/Functional History  Lives With: Spouse  Type of Home: House  Active : Yes  Education: Some college  Occupation: Retired  Type of occupation: Pt worked at [employer]. Leisure & Hobbies: Pt enjoys crocheting, reading, and walking. IADL History  Active : Yes  Education: Some college  Occupation: Retired  Type of occupation: Pt worked at [employer]. Leisure & Hobbies: Pt enjoys crocheting, reading, and walking. Vision  Vision: Impaired  Vision Exceptions: Wears glasses for reading  Hearing  Hearing: Within functional limits        Objective:  Oral/Motor  Oral Motor: Within functional limits     Auditory Comprehension  Comprehension: Within Functional Limits (informally assessed)     Expression  Primary Mode of Expression: Verbal     Verbal Expression  Verbal Expression: Within functional limits (informally assessed)    Written Expression  Written Expression: Unable to assess     Motor Speech  Motor Speech: Within Functional Limits     Pragmatics/Social Functioning  Pragmatics: Within functional limits       Cognition (informally assessed):  Orientation  Overall Orientation Status: Within Functional Limits  Attention  Attention: Within Functional Limits  Memory  Memory: Within Funtional Limits  Problem Solving  Problem Solving: Within Functional Limits  Safety/Judgement  Safety/Judgement: Within Functional Limits    Additional Assessments:  Voice Evaluation  Vocal Quality: Exceptions to [facility]  Breath Support: Adequate for speech(slightly decreased)  Aphonic: Mild  Breathy: Moderate  Hoarse:  Moderate  Dysphonic: Moderate  Vocal Intensity: Moderately decreased  Maximum Phonation Time: 15 seconds  Conversational Loudness Level (dB): 56 Decibels  S/Z Ratio: 1.13 (score >1 indicates glottal insufficiency)      -hoarse, strained, and breath vocal quality with reduced intensity (56 dB) in connected speech  -pitch variations  - Reading of [place]- 100% intelligible      Plan:    Goals:   Short-term Goals  Goal 1: Patient will participate in vocal strengthening exercises for improved vocal quality with no cues. Goal 2: Patient will increase maximum phonation time to > 20 seconds. Goal 3: Pt will increase vocal intensity to >65 dB in conversation with no cues. Speech Therapy Prognosis  Prognosis: Good  Prognosis Considerations: Age, Participation Level, Family/Community Support, Previous Level of Function    Duration/Frequency of Treatment  Duration/Frequency of Treatment: 2x week x4-6 weeks    Recommendations  Requires SLP Intervention: Yes  Patient Education: Role of SLP, results of assessment and general speech pathology plan of care reviewed with patient following evaluation. Patient Education Response: Verbalizes understanding    Timed Code Treatment Minutes: 0 Minutes  Total Treatment Time: 62  Requires SLP Intervention: Yes  Patient/family involved in developing goals and treatment plan: yes      Ashleigh BAUM  CCC-SLP S.P. [id number]  Speech-Language Pathologist

## 2019-06-26 NOTE — PROGRESS NOTES
[facility] - Voice      Patient: Eulalia Aguero  : 1950  MRN: [id number]  Date: 2019  Electronically Signed by: Rhett Barrera MA, CCC-SLP       Note: This FCM should not be used for individuals who have had a laryngectomy or tracheotomy, or for individuals with resonance disorders. []  Level 1 The individual is unable to use voice to communicate. Alternative means for communicating are used all of the time. The individual cannot participate in vocational, avocaional, and social activities requiring voice. []  Level 2 Voice is not functional for communication most of the time. Alternative means for communicating must be used most of the time. The individual's participation in vocational, avocational, and social activities is significantly limited all of the time. [x]  Level 3  Voice is functional for communication, but is consistently distracting and interferes with communication by drawing attention to itself. Participation in vocational, avocational, and social activities is limited most of the time.      []  Level 4 Voice is functional for communication, but sometimes distracting. The individual's ability to participate in vocational, avocational, and social activities requiring voice is occasionally affected in low-vocal demand activities, but consistently affected in high-vocal demand activities. []  Level 5 Voice occasionally sounds normal with self-monitoring, but there is some situational variation. The individual's ability to participate in vocational, avocational, and social activities requiring voice is rarely affected in low-vocal demand activities, but is occasionally affected in high-vocal demand activities. []  Level 6 Voice sounds normal most of the time across all settings and situations. Self-monitoring is consistent when needed.   The individual's ability to participate in vocational, avocational, and social activities requiring voice is not affected in low-vocal demand activities, but is rarely affected in high-vocal demand activities. []  Level 7 The individual's ability to successfully and independently participate in vocational, avocational, and social activities requiring high-or low-vocal demands is not limited by voice. Self-monitoring is effectively used, but only occasionally needed.

## 2019-07-03 ENCOUNTER — HOSPITAL ENCOUNTER (OUTPATIENT)
Dept: SPEECH THERAPY | Age: 69
Setting detail: THERAPIES SERIES
Discharge: HOME | End: 2019-07-03
Payer: MEDICARE

## 2019-07-03 PROCEDURE — 92507 TX SP LANG VOICE COMM INDIV: CPT

## 2019-07-09 ENCOUNTER — HOSPITAL ENCOUNTER (OUTPATIENT)
Dept: SPEECH THERAPY | Age: 69
Setting detail: THERAPIES SERIES
Discharge: HOME | End: 2019-07-09
Payer: MEDICARE

## 2019-07-09 PROCEDURE — 92507 TX SP LANG VOICE COMM INDIV: CPT

## 2019-07-11 ENCOUNTER — HOSPITAL ENCOUNTER (OUTPATIENT)
Dept: SPEECH THERAPY | Age: 69
Setting detail: THERAPIES SERIES
Discharge: HOME | End: 2019-07-11
Payer: MEDICARE

## 2019-07-11 PROCEDURE — 92507 TX SP LANG VOICE COMM INDIV: CPT

## 2019-07-11 NOTE — FLOWSHEET NOTE
Speech-Language Pathology  Daily Treatment Note    Date:  2019    Patient Name:  Carlene Weston    :  1950  MRN: [id number]  Restrictions/Precautions:    Diagnosis:  Hoarseness of voice (R49.0)  Treatment Diagnosis:  Moderate Dysphonia  Insurance/Certification information:  Medicare  Referring Physician:  Dr. Maritza Barreto of care signed (Y/N):  Y; 19   Visit# / total visits:    Pain level: 0/10  Medicare $ including today's session: $392.26      Progress Note: []  Yes  [x]  No  Next due by: Visit #4/10     Subjective:  Pt reports family/friends noticing a difference in her voice at dinner last night. Pt stated she feels like she's shouting when she is using a \"normal\" voice. Education again provided regarding vocal hygiene strategies and encouraged pt to stop whispering and use \"normal\" voice. Pt verbalized understanding. Objective:   1.) Patient will participate in vocal strengthening exercises for improved vocal quality with no cues. -Resonant voice therapy to achieve front focused voice initiated this date. Pt required mod-max cues to carryover and complete.  -Focus on Vocal Function exercises this date as follows:  -Pt completed warm-up exercises x5, sustaining \"e\" on note C: average 8.5 seconds   -C:average 8.5 seconds   -D: average 9 seconds   -E:average 9 seconds   -F: average 7.2 seconds   -G: average 8.3 seconds  -Pt instructed in relaxation exercises x2 completed x5 repetitions each. -Pt encouraged to take a breath in through nose and open mouth wide during vocal function exercises  -Pt completed glides up and down x15 each    2.) Patient will increase maximum phonation time to > 20 seconds. -Max phonation time: 8.4 seconds    3.) Pt will increase vocal intensity to >65 dB in conversation with no cues. -Pt with increased volume when cued to use \"normal\" voice. Assessment: Pt making adequate progress towards goals.  Pt with improved vocal quality after

## 2019-07-16 ENCOUNTER — HOSPITAL ENCOUNTER (OUTPATIENT)
Dept: SPEECH THERAPY | Age: 69
Setting detail: THERAPIES SERIES
Discharge: HOME | End: 2019-07-16
Payer: MEDICARE

## 2019-07-17 ENCOUNTER — HOSPITAL ENCOUNTER (OUTPATIENT)
Dept: CT IMAGING | Age: 69
Discharge: HOME | End: 2019-07-17

## 2019-07-17 DIAGNOSIS — R93.1 ABNORMAL ECHOCARDIOGRAM: ICD-10-CM

## 2019-07-17 PROCEDURE — 75571 CT HRT W/O DYE W/CA TEST: CPT

## 2019-07-18 ENCOUNTER — HOSPITAL ENCOUNTER (OUTPATIENT)
Dept: SPEECH THERAPY | Age: 69
Setting detail: THERAPIES SERIES
Discharge: HOME | End: 2019-07-18
Payer: MEDICARE

## 2019-07-18 PROCEDURE — 92507 TX SP LANG VOICE COMM INDIV: CPT

## 2019-07-23 ENCOUNTER — HOSPITAL ENCOUNTER (OUTPATIENT)
Dept: SPEECH THERAPY | Age: 69
Setting detail: THERAPIES SERIES
Discharge: HOME | End: 2019-07-23
Payer: MEDICARE

## 2019-07-23 PROCEDURE — 92507 TX SP LANG VOICE COMM INDIV: CPT

## 2019-07-25 ENCOUNTER — HOSPITAL ENCOUNTER (EMERGENCY)
Age: 69
Discharge: HOME | End: 2019-07-25
Attending: EMERGENCY MEDICINE
Payer: MEDICARE

## 2019-07-25 ENCOUNTER — APPOINTMENT (OUTPATIENT)
Dept: CT IMAGING | Age: 69
End: 2019-07-25
Payer: MEDICARE

## 2019-07-25 ENCOUNTER — HOSPITAL ENCOUNTER (OUTPATIENT)
Dept: SPEECH THERAPY | Age: 69
Setting detail: THERAPIES SERIES
Discharge: HOME | End: 2019-07-25
Payer: MEDICARE

## 2019-07-25 VITALS
BODY MASS INDEX: 23.84 KG/M2 | HEART RATE: 73 BPM | RESPIRATION RATE: 16 BRPM | OXYGEN SATURATION: 99 % | TEMPERATURE: 97.7 F | HEIGHT: 65 IN | SYSTOLIC BLOOD PRESSURE: 133 MMHG | WEIGHT: 143.1 LBS | DIASTOLIC BLOOD PRESSURE: 78 MMHG

## 2019-07-25 DIAGNOSIS — M54.50 ACUTE MIDLINE LOW BACK PAIN WITHOUT SCIATICA: Primary | ICD-10-CM

## 2019-07-25 PROCEDURE — 99283 EMERGENCY DEPT VISIT LOW MDM: CPT

## 2019-07-25 PROCEDURE — 96372 THER/PROPH/DIAG INJ SC/IM: CPT

## 2019-07-25 PROCEDURE — 6370000000 HC RX 637 (ALT 250 FOR IP): Performed by: EMERGENCY MEDICINE

## 2019-07-25 PROCEDURE — 72131 CT LUMBAR SPINE W/O DYE: CPT

## 2019-07-25 PROCEDURE — 6360000002 HC RX W HCPCS: Performed by: EMERGENCY MEDICINE

## 2019-07-25 RX ORDER — HYDROCODONE BITARTRATE AND ACETAMINOPHEN 5; 325 MG/1; MG/1
1 TABLET ORAL ONCE
Status: COMPLETED | OUTPATIENT
Start: 2019-07-25 | End: 2019-07-25

## 2019-07-25 RX ORDER — KETOROLAC TROMETHAMINE 30 MG/ML
30 INJECTION, SOLUTION INTRAMUSCULAR; INTRAVENOUS ONCE
Status: COMPLETED | OUTPATIENT
Start: 2019-07-25 | End: 2019-07-25

## 2019-07-25 RX ORDER — HYDROCODONE BITARTRATE AND ACETAMINOPHEN 5; 325 MG/1; MG/1
1 TABLET ORAL EVERY 6 HOURS PRN
Qty: 12 TABLET | Refills: 0 | Status: SHIPPED | OUTPATIENT
Start: 2019-07-25 | End: 2019-07-28

## 2019-07-25 RX ORDER — METHOCARBAMOL 500 MG/1
750 TABLET, FILM COATED ORAL ONCE
Status: COMPLETED | OUTPATIENT
Start: 2019-07-25 | End: 2019-07-25

## 2019-07-25 RX ORDER — METHOCARBAMOL 750 MG/1
750-1500 TABLET, FILM COATED ORAL EVERY 8 HOURS PRN
Qty: 40 TABLET | Refills: 0 | Status: SHIPPED | OUTPATIENT
Start: 2019-07-25 | End: 2019-08-04

## 2019-07-25 RX ADMIN — HYDROCODONE BITARTRATE AND ACETAMINOPHEN 1 TABLET: 5; 325 TABLET ORAL at 10:12

## 2019-07-25 RX ADMIN — KETOROLAC TROMETHAMINE 30 MG: 30 INJECTION, SOLUTION INTRAMUSCULAR at 08:57

## 2019-07-25 RX ADMIN — METHOCARBAMOL TABLETS 750 MG: 500 TABLET, COATED ORAL at 08:57

## 2019-07-25 ASSESSMENT — PAIN DESCRIPTION - ORIENTATION: ORIENTATION: LOWER

## 2019-07-25 ASSESSMENT — PAIN DESCRIPTION - PROGRESSION: CLINICAL_PROGRESSION: RAPIDLY IMPROVING

## 2019-07-25 ASSESSMENT — PAIN SCALES - GENERAL
PAINLEVEL_OUTOF10: 3
PAINLEVEL_OUTOF10: 7

## 2019-07-25 ASSESSMENT — PAIN DESCRIPTION - LOCATION: LOCATION: BACK

## 2019-07-25 ASSESSMENT — PAIN DESCRIPTION - PAIN TYPE: TYPE: ACUTE PAIN

## 2019-07-25 NOTE — ED PROVIDER NOTES
mouth Twice a Week 24 tablet 3    beclomethasone (QVAR) 40 MCG/ACT inhaler Inhale 2 puffs into the lungs 2 times daily 3 Inhaler 3    PROAIR  (90 Base) MCG/ACT inhaler INHALE 2 PUFFS INTO LUNGS EVERY 6 HOURS AS NEEDED FOR WHEEZING OR SHORTNESS OF BREATH 8.5 g 0    fluticasone (FLOVENT HFA) 220 MCG/ACT inhaler Inhale 1 puff into the lungs 2 times daily 1 Inhaler 3    SUMAtriptan (IMITREX) 100 MG tablet TAKE 1 TABLET BY MOUTH ONCE AS NEEDED FOR MIGRAINE; may repeat if needed x 1 in 2 hours 9 tablet 11    topiramate (TOPAMAX) 25 MG tablet TAKE ONE TABLET BY MOUTH TWICE DAILY 60 tablet 11    Diclofenac Potassium 50 MG PACK 1 prn for headache do not exceed 2 in 24 hour period 9 each 5    calcium carbonate 600 MG TABS tablet Take 1 tablet by mouth daily      Cholecalciferol (VITAMIN D-3) 1000 UNITS CAPS Take by mouth      magnesium oxide (MAG-OX) 400 MG tablet Take 400 mg by mouth daily      vitamin B-12 (CYANOCOBALAMIN) 100 MCG tablet Take 50 mcg by mouth daily.  cetirizine (ZYRTEC) 10 MG tablet Take 10 mg by mouth daily.  azithromycin (ZITHROMAX) 250 MG tablet Take 2 tabs po today then 1 daily x 4 days 1 packet 0     Allergies   Allergen Reactions    Ampicillin     Flagyl [Metronidazole]     Primaxin [Imipenem W-Cilastatin Sodium]     Sulfa Antibiotics        REVIEW OF SYSTEMS  6 systems reviewed, pertinent positives per HPI otherwise noted to be negative    PHYSICAL EXAM  /78   Pulse 73   Temp 97.7 °F (36.5 °C) (Infrared)   Resp 16   Ht 5' 5\" (1.651 m)   Wt 143 lb 1.6 oz (64.9 kg)   SpO2 99%   BMI 23.81 kg/m²   GENERAL APPEARANCE: Awake and alert. Cooperative. No acute distress. HEAD: Normocephalic. Atraumatic. CV: Brisk capillary refill  LUNGS: Breathing is unlabored. Speaking comfortably in full sentences. EXTREMITIES: MAEE. No acute deformities. All extremities neurovascularly intact. BACK: Non-tender thoracic and lumbar spine. SKIN: Warm and dry.   NEUROLOGICAL: Alert and oriented. Normal plantar and dorsal flexion in the bilateral feet. Normal sensation on light touch to the bilateral medial thighs and lower legs. Normal patellar reflexes bilaterally. RADIOLOGY  CT Lumbar Spine WO Contrast   Final Result   Unremarkable non-contrast CT of the lumbar spine. ED COURSE/MDM  Patient seen and evaluated. The patient has no acute injury, saddle anesthesia, urinary retention, fecal incontinence, or leg weakness, so imaging is not indicated at this time. I discussed plan and exam with Patient. I do feel patient can be safely discharged to home. Recommend follow up with PMD for re-evaluation. Reasons to RT ED discussed, including worsening or increased pain, saddle anesthesia, urinary retention, fecal incontinence, new leg numbness, and new leg weakness. Patient expresses understanding and is in agreement with plan. I have considered the following diagnoses in my evaluation of the patient: ABDOMINAL AORTIC ANEURYSM, AORTIC DISSECTION, CAUDA EQUINA SYNDROME, EPIDURAL MASS LESION, SPINAL STENOSIS, SPINAL CORD COMPRESSION, SPINAL CORD INJURY, HERNIATED DISK CAUSING SEVERE STENOSIS    Patient was given scripts for the following medications. Discharge Medication List as of 7/25/2019 10:08 AM      START taking these medications    Details   HYDROcodone-acetaminophen (NORCO) 5-325 MG per tablet Take 1 tablet by mouth every 6 hours as needed for Pain for up to 3 days. , Disp-12 tablet, R-0Print      methocarbamol (ROBAXIN-750) 750 MG tablet Take 1-2 tablets by mouth every 8 hours as needed (muscle cramps or pain), Disp-40 tablet, R-0Print               FINAL IMPRESSION  1. Acute midline low back pain without sciatica        Blood pressure 133/78, pulse 73, temperature 97.7 °F (36.5 °C), temperature source Infrared, resp. rate 16, height 5' 5\" (1.651 m), weight 143 lb 1.6 oz (64.9 kg), SpO2 99 %. DISPOSITION  Patient was discharged to home in good condition. [place] MD Malik  07/25/19 2752

## 2019-07-26 ENCOUNTER — HOSPITAL ENCOUNTER (OUTPATIENT)
Dept: PULMONOLOGY | Age: 69
Discharge: HOME | End: 2019-07-26
Payer: MEDICARE

## 2019-07-26 VITALS — RESPIRATION RATE: 16 BRPM | HEART RATE: 69 BPM | OXYGEN SATURATION: 98 %

## 2019-07-26 PROCEDURE — 94726 PLETHYSMOGRAPHY LUNG VOLUMES: CPT

## 2019-07-26 PROCEDURE — 94200 LUNG FUNCTION TEST (MBC/MVV): CPT

## 2019-07-26 PROCEDURE — 94760 N-INVAS EAR/PLS OXIMETRY 1: CPT

## 2019-07-26 PROCEDURE — 94729 DIFFUSING CAPACITY: CPT

## 2019-07-26 PROCEDURE — 94010 BREATHING CAPACITY TEST: CPT

## 2019-07-26 PROCEDURE — 94070 EVALUATION OF WHEEZING: CPT

## 2019-07-26 PROCEDURE — 6360000002 HC RX W HCPCS: Performed by: INTERNAL MEDICINE

## 2019-07-26 PROCEDURE — 6370000000 HC RX 637 (ALT 250 FOR IP): Performed by: INTERNAL MEDICINE

## 2019-07-26 RX ORDER — ALBUTEROL SULFATE 2.5 MG/3ML
2.5 SOLUTION RESPIRATORY (INHALATION) ONCE
Status: COMPLETED | OUTPATIENT
Start: 2019-07-26 | End: 2019-07-26

## 2019-07-26 RX ORDER — SODIUM CHLORIDE FOR INHALATION 0.9 %
3 VIAL, NEBULIZER (ML) INHALATION ONCE
Status: COMPLETED | OUTPATIENT
Start: 2019-07-26 | End: 2019-07-26

## 2019-07-26 RX ADMIN — METHACHOLINE CHLORIDE 1 MG: 100 POWDER, FOR SOLUTION RESPIRATORY (INHALATION) at 09:01

## 2019-07-26 RX ADMIN — ISODIUM CHLORIDE 3 ML: 0.03 SOLUTION RESPIRATORY (INHALATION) at 08:29

## 2019-07-26 RX ADMIN — METHACHOLINE CHLORIDE 4 MG: 100 POWDER, FOR SOLUTION RESPIRATORY (INHALATION) at 09:04

## 2019-07-26 RX ADMIN — METHACHOLINE CHLORIDE 0.25 MG: 100 POWDER, FOR SOLUTION RESPIRATORY (INHALATION) at 08:57

## 2019-07-26 RX ADMIN — METHACHOLINE CHLORIDE 0.06 MG: 100 POWDER, FOR SOLUTION RESPIRATORY (INHALATION) at 08:53

## 2019-07-26 RX ADMIN — ALBUTEROL SULFATE 2.5 MG: 2.5 SOLUTION RESPIRATORY (INHALATION) at 09:07

## 2019-07-29 NOTE — PROCEDURES
Pulmonary Function Testing      Patient name:  Cortney Mcclendon     [address] [place] [address] Unit #:   [id number]   Date of test:  7/26/2019  Date of interpretation:   7/29/2019    Ms. Cortney Mcclendon is a 71y.o. year-old non smoker. The spirometry data were acceptable and reproducible. Spirometry:  Flow volume loops were normal. The FEV-1/FVC ratio was normal. The FEV-1 was 2.02 liters (84% of predicted), which was normal. The FVC was 2.81 liters (89% of predicted), which was normal. Response to inhaled bronchodilators (albuterol) was not significant. Lung volumes:  Lung volumes were tested by plethysmography. The total lung capacity was 5.25 liters (105% of predicted), which was normal. The residual volume was 2.21 liters (110% of predicted), which was normal. The ratio of residual volume to total lung capacity (RV/TLC) was 42, which was normal.     Diffusion capacity was found to be normal.       Methacholine Challenge test: Significant decrease in FEV 1 noted at level 4 dose of methacholine challenge. Interpretation:  Normal PFT with positive methacholine Challenge test. COnsider asthma or reactive airway disease in differential diagnosis.     Comments:

## 2019-07-30 ENCOUNTER — HOSPITAL ENCOUNTER (OUTPATIENT)
Dept: SPEECH THERAPY | Age: 69
Setting detail: THERAPIES SERIES
Discharge: HOME | End: 2019-07-30
Payer: MEDICARE

## 2019-07-30 PROCEDURE — 92507 TX SP LANG VOICE COMM INDIV: CPT

## 2019-08-01 ENCOUNTER — HOSPITAL ENCOUNTER (OUTPATIENT)
Dept: SPEECH THERAPY | Age: 69
Setting detail: THERAPIES SERIES
Discharge: HOME | End: 2019-08-01
Payer: MEDICARE

## 2019-08-01 PROCEDURE — 92507 TX SP LANG VOICE COMM INDIV: CPT

## 2019-08-01 NOTE — FLOWSHEET NOTE
and hoarseness    2.) Patient will increase maximum phonation time to > 20 seconds. -Max phonation time: 15.5 seconds    3.) Pt will increase vocal intensity to >65 dB in conversation with no cues. -Pt with average of 70 dB during tx tasks this date. Assessment: Pt making adequate progress towards goals. Pt with improve vocal quality and strength this date. Pt would benefit from continued voice therapy to improve vocal quality and carry over. Plan: Continue voice tx 2x week x4-6 weeks per plan of care. Timed Code Treatment: 0 minutes    Total Treatment Time: 48 minutes    Signature:        Nikki BAUM CCC-SLP S.P. [id number]  Speech-Language Pathologist

## 2019-08-06 ENCOUNTER — HOSPITAL ENCOUNTER (OUTPATIENT)
Dept: SPEECH THERAPY | Age: 69
Setting detail: THERAPIES SERIES
Discharge: HOME | End: 2019-08-06
Payer: MEDICARE

## 2019-08-06 PROCEDURE — 92507 TX SP LANG VOICE COMM INDIV: CPT

## 2019-08-06 NOTE — FLOWSHEET NOTE
Speech-Language Pathology  Daily Treatment Note    Date:  2019    Patient Name:  Faizan Dangelo    :  1950  MRN: [id number]  Restrictions/Precautions:    Diagnosis:  Hoarseness of voice (R49.0)  Treatment Diagnosis:  Moderate Dysphonia  Insurance/Certification information:  Medicare  Referring Physician:  Dr. Ciara Marcos of care signed (Y/N):  Y; 19   Visit# / total visits:    Pain level: Pt reported back pain  Medicare $ including today's session: $774.76      Progress Note: []  Yes  [x]  No  Next due by: Visit #9/10     Subjective:  Pt reports \"throwing her back out\" last week, 19. Per pt, this is the third time since 2019. Pt stated she is currently taking muscle relaxer, steroids, and pain medication for back pain. Pt reports her exercises were not going well the past few days since hurting her back. She also reports crying a lot. Pt appeared to have increased anxiety and stress this date. Education provided regarding affect of pain and stress on voice. Objective:   1.) Patient will participate in vocal strengthening exercises for improved vocal quality with no cues.   -Pt instructed in relaxation exercises x2 completed x5 repetitions each.   -Focus on Vocal Function exercises this date as follows:  -Pt completed glides up and down x10 each  -Pt completed warm-up exercises x5, sustaining \"e\" on note C: average 11.4 seconds   -C:average 14 seconds   -D: average 18.2 seconds   -E:average 18.0 seconds   -F: average 16.3 seconds  -Pt recalled to take breaths prior to speaking, at end of sentences, and during natural pauses in speech.  -Pt read 5-7 word sentences with 93% accuracy without pitch breaks  -Pt read page from her book with 96% accuracy without pitch breaks  -Pt with overall improve vocal quality this date when using \"strong\" voice  -However, pt initially using \"whisper\" voice upon arrival.    2.) Patient will increase maximum phonation time to > 20

## 2019-08-08 ENCOUNTER — HOSPITAL ENCOUNTER (OUTPATIENT)
Dept: SPEECH THERAPY | Age: 69
Setting detail: THERAPIES SERIES
Discharge: HOME | End: 2019-08-08
Payer: MEDICARE

## 2019-08-08 PROCEDURE — 92507 TX SP LANG VOICE COMM INDIV: CPT

## 2019-08-09 ENCOUNTER — OFFICE VISIT (OUTPATIENT)
Dept: ORTHOPEDIC SURGERY | Age: 69
End: 2019-08-09
Payer: MEDICARE

## 2019-08-09 ENCOUNTER — PRE-EVALUATION (OUTPATIENT)
Dept: ORTHOPEDIC SURGERY | Age: 69
End: 2019-08-09

## 2019-08-09 VITALS
BODY MASS INDEX: 23.99 KG/M2 | HEIGHT: 65 IN | WEIGHT: 144 LBS | HEART RATE: 71 BPM | DIASTOLIC BLOOD PRESSURE: 80 MMHG | SYSTOLIC BLOOD PRESSURE: 119 MMHG

## 2019-08-09 DIAGNOSIS — Z98.1 S/P CERVICAL SPINAL FUSION: Primary | ICD-10-CM

## 2019-08-09 DIAGNOSIS — M50.30 DDD (DEGENERATIVE DISC DISEASE), CERVICAL: ICD-10-CM

## 2019-08-09 PROCEDURE — 1036F TOBACCO NON-USER: CPT | Performed by: ORTHOPAEDIC SURGERY

## 2019-08-09 PROCEDURE — 99203 OFFICE O/P NEW LOW 30 MIN: CPT | Performed by: ORTHOPAEDIC SURGERY

## 2019-08-09 PROCEDURE — 1123F ACP DISCUSS/DSCN MKR DOCD: CPT | Performed by: ORTHOPAEDIC SURGERY

## 2019-08-09 PROCEDURE — 4040F PNEUMOC VAC/ADMIN/RCVD: CPT | Performed by: ORTHOPAEDIC SURGERY

## 2019-08-09 PROCEDURE — 1090F PRES/ABSN URINE INCON ASSESS: CPT | Performed by: ORTHOPAEDIC SURGERY

## 2019-08-09 PROCEDURE — 3017F COLORECTAL CA SCREEN DOC REV: CPT | Performed by: ORTHOPAEDIC SURGERY

## 2019-08-09 PROCEDURE — APPNB30 APP NON BILLABLE TIME 0-30 MINS: Performed by: PHYSICIAN ASSISTANT

## 2019-08-09 PROCEDURE — G8399 PT W/DXA RESULTS DOCUMENT: HCPCS | Performed by: ORTHOPAEDIC SURGERY

## 2019-08-09 PROCEDURE — G8420 CALC BMI NORM PARAMETERS: HCPCS | Performed by: ORTHOPAEDIC SURGERY

## 2019-08-09 PROCEDURE — G8427 DOCREV CUR MEDS BY ELIG CLIN: HCPCS | Performed by: ORTHOPAEDIC SURGERY

## 2019-08-09 RX ORDER — BECLOMETHASONE DIPROPIONATE HFA 40 UG/1
AEROSOL, METERED RESPIRATORY (INHALATION)
COMMUNITY
Start: 2019-06-26

## 2019-08-09 NOTE — PROGRESS NOTES
and alert and oriented to person, place, and time. She demonstrates appropriate mood and affect. She walks with a normal gait. HEENT:   Her cervical flexion, extension, and axial rotation are moderately reduced with pain. Her skin is warm and dry. She has no tenderness over her cervical spine and no obvious muscle spasm. The skin over her cervical spine is normal without surgical scar. Upper extremities:  She has 5/5 strength of her interosseous muscles, wrist dorsiflexors and volarflexors, biceps, triceps, deltoids, and internal and external rotators of her shoulders, bilaterally. Her biceps, triceps, bracheoradialis, quadriceps and achilles reflexes are 2+, bilaterally. Sensation is intact to light touchfrom C6 to C8. She has no clonus and negative Chandra's bilaterally. Lower extremities:  Normal DTR knees, no clonus. Imaging:   I reviewed AP and lateral xray images of her cervical spine from the office today. They show s/p anterior cervical fusion C4-C7, with anterior plate and screw system C6-7. Moderate to severe spondylosis noted C3-4. MRI cervical spine from 2017 notes post surgical changes with mild spinal stenosis and severe bilatearl foraminal narrowing C3-4 above her fusion. Assessment:   Cervical spondylosis with radiculopathy. S/P C4-C7 fusion    Plan:   We discussed treatment options including observation, epidural injections, physical therapy and anterior cervical discectomy and fusion. I recommend she have a new MRI of her cervical spine. We will call her with her MRI results.

## 2019-08-15 ENCOUNTER — HOSPITAL ENCOUNTER (OUTPATIENT)
Dept: WOMENS IMAGING | Age: 69
Discharge: HOME | End: 2019-08-15
Payer: MEDICARE

## 2019-08-15 DIAGNOSIS — Z12.39 BREAST CANCER SCREENING: ICD-10-CM

## 2019-08-15 PROCEDURE — 77063 BREAST TOMOSYNTHESIS BI: CPT

## 2019-10-03 ENCOUNTER — HOSPITAL ENCOUNTER (OUTPATIENT)
Dept: ULTRASOUND IMAGING | Age: 69
Discharge: HOME | End: 2019-10-03
Payer: MEDICARE

## 2019-10-03 DIAGNOSIS — E04.1 THYROID NODULE: ICD-10-CM

## 2019-10-03 DIAGNOSIS — R49.0 HOARSENESS OF VOICE: ICD-10-CM

## 2019-10-03 DIAGNOSIS — E04.9 GOITER: ICD-10-CM

## 2019-10-03 PROCEDURE — 76536 US EXAM OF HEAD AND NECK: CPT

## 2019-10-25 ENCOUNTER — OFFICE VISIT (OUTPATIENT)
Dept: ENT CLINIC | Age: 69
End: 2019-10-25
Payer: MEDICARE

## 2019-10-25 VITALS
BODY MASS INDEX: 24.36 KG/M2 | HEART RATE: 71 BPM | DIASTOLIC BLOOD PRESSURE: 78 MMHG | WEIGHT: 146.2 LBS | SYSTOLIC BLOOD PRESSURE: 121 MMHG | HEIGHT: 65 IN

## 2019-10-25 DIAGNOSIS — J38.3 AGE-RELATED VOCAL CORD ATROPHY: ICD-10-CM

## 2019-10-25 DIAGNOSIS — R49.0 HOARSENESS OF VOICE: ICD-10-CM

## 2019-10-25 DIAGNOSIS — E04.1 THYROID NODULE: Primary | ICD-10-CM

## 2019-10-25 PROCEDURE — 1123F ACP DISCUSS/DSCN MKR DOCD: CPT | Performed by: OTOLARYNGOLOGY

## 2019-10-25 PROCEDURE — G8420 CALC BMI NORM PARAMETERS: HCPCS | Performed by: OTOLARYNGOLOGY

## 2019-10-25 PROCEDURE — 31505 DIAGNOSTIC LARYNGOSCOPY: CPT | Performed by: OTOLARYNGOLOGY

## 2019-10-25 PROCEDURE — 1090F PRES/ABSN URINE INCON ASSESS: CPT | Performed by: OTOLARYNGOLOGY

## 2019-10-25 PROCEDURE — G8484 FLU IMMUNIZE NO ADMIN: HCPCS | Performed by: OTOLARYNGOLOGY

## 2019-10-25 PROCEDURE — G8427 DOCREV CUR MEDS BY ELIG CLIN: HCPCS | Performed by: OTOLARYNGOLOGY

## 2019-10-25 PROCEDURE — 1036F TOBACCO NON-USER: CPT | Performed by: OTOLARYNGOLOGY

## 2019-10-25 PROCEDURE — 3017F COLORECTAL CA SCREEN DOC REV: CPT | Performed by: OTOLARYNGOLOGY

## 2019-10-25 PROCEDURE — G8399 PT W/DXA RESULTS DOCUMENT: HCPCS | Performed by: OTOLARYNGOLOGY

## 2019-10-25 PROCEDURE — 4040F PNEUMOC VAC/ADMIN/RCVD: CPT | Performed by: OTOLARYNGOLOGY

## 2020-05-15 ENCOUNTER — OFFICE VISIT (OUTPATIENT)
Dept: ENT CLINIC | Age: 70
End: 2020-05-15
Payer: MEDICARE

## 2020-05-15 ENCOUNTER — HOSPITAL ENCOUNTER (OUTPATIENT)
Age: 70
Discharge: HOME | End: 2020-05-15
Payer: MEDICARE

## 2020-05-15 VITALS
DIASTOLIC BLOOD PRESSURE: 83 MMHG | BODY MASS INDEX: 24.33 KG/M2 | SYSTOLIC BLOOD PRESSURE: 145 MMHG | HEART RATE: 68 BPM | TEMPERATURE: 97.5 F | HEIGHT: 65 IN

## 2020-05-15 PROBLEM — J38.3 AGE-RELATED VOCAL CORD ATROPHY: Status: ACTIVE | Noted: 2020-05-15

## 2020-05-15 LAB
T4 FREE: 1.4 NG/DL (ref 0.9–1.8)
TSH SERPL DL<=0.05 MIU/L-ACNC: 0.61 UIU/ML (ref 0.27–4.2)

## 2020-05-15 PROCEDURE — 99214 OFFICE O/P EST MOD 30 MIN: CPT | Performed by: OTOLARYNGOLOGY

## 2020-05-15 PROCEDURE — 84439 ASSAY OF FREE THYROXINE: CPT

## 2020-05-15 PROCEDURE — 3017F COLORECTAL CA SCREEN DOC REV: CPT | Performed by: OTOLARYNGOLOGY

## 2020-05-15 PROCEDURE — G8427 DOCREV CUR MEDS BY ELIG CLIN: HCPCS | Performed by: OTOLARYNGOLOGY

## 2020-05-15 PROCEDURE — G8420 CALC BMI NORM PARAMETERS: HCPCS | Performed by: OTOLARYNGOLOGY

## 2020-05-15 PROCEDURE — 1090F PRES/ABSN URINE INCON ASSESS: CPT | Performed by: OTOLARYNGOLOGY

## 2020-05-15 PROCEDURE — 84443 ASSAY THYROID STIM HORMONE: CPT

## 2020-05-15 PROCEDURE — 1123F ACP DISCUSS/DSCN MKR DOCD: CPT | Performed by: OTOLARYNGOLOGY

## 2020-05-15 PROCEDURE — 4040F PNEUMOC VAC/ADMIN/RCVD: CPT | Performed by: OTOLARYNGOLOGY

## 2020-05-15 PROCEDURE — G8399 PT W/DXA RESULTS DOCUMENT: HCPCS | Performed by: OTOLARYNGOLOGY

## 2020-05-15 PROCEDURE — 1036F TOBACCO NON-USER: CPT | Performed by: OTOLARYNGOLOGY

## 2020-05-15 PROCEDURE — 36415 COLL VENOUS BLD VENIPUNCTURE: CPT

## 2020-05-15 RX ORDER — M-VIT,TX,IRON,MINS/CALC/FOLIC 27MG-0.4MG
1 TABLET ORAL DAILY
COMMUNITY

## 2020-05-15 RX ORDER — MONTELUKAST SODIUM 10 MG/1
10 TABLET ORAL NIGHTLY
COMMUNITY

## 2020-05-15 NOTE — PROGRESS NOTES
[facility]         PCP:  Robert Grossman III, DO      CHIEF COMPLAINT:  Chief Complaint   Patient presents with    Thyroid Problem       HISTORY OF PRESENT ILLNESS:   Margo Brown is a 71 y.o. female here for recheck of a problem located in the thyroid gland and throat. The quality is a thyroid nodule. The severity is mild. Associated symptoms include hoarseness and some dysphagia, mainly for pills. She has been found to have bilateral vocal cord atrophy of aging previously. Since the last visit here, the right thyroid has enlarged, per . The hoarseness is the same. REVIEW OF SYSTEMS:   CONSTITUTIONAL:  Denied fever. Denied chills. Denied unexplained weight loss, over 20 pounds in the past six months. EARS, NOSE, THROAT:  Denied otorrhea, otalgia, hearing loss, rhinorrhea, and sore throat. RESPIRATORY:  Denied cough, dyspnea, and SOB. PAST MEDICAL HISTORY:  Past Medical History:   Diagnosis Date    Asthma     Crohn disease ([address]     Headache(784.0)     Hyperlipidemia 7/28/2016    Migraine     Thyroid disease         Past Surgical History:   Procedure Laterality Date    APPENDECTOMY  1993    CERVICAL FUSION  2002, 2011    CHOLECYSTECTOMY  1998    HYSTERECTOMY      Total; Cervical cancer    THYROIDECTOMY, PARTIAL  2010    TONSILLECTOMY      TUBAL LIGATION  1979    TYMPANOPLASTY            EXAMINATION:        Vitals:    05/15/20 0855 05/15/20 0906   BP: (!) 142/81 (!) 145/83   Pulse: 71 68   Temp: 97.5 °F (36.4 °C)    Height: 5' 5\" (1.651 m)       VITALS SIGNS were reviewed. GENERAL APPEARANCE:  Well developed, well nourished, no apparent distress, no apparent deformities. ABILITY TO COMMUNICATE/QUALITY OF VOICE:  Moderate hoarseness. Communicated without difficulty. EXTERNAL EAR/NOSE:  Normal pinnae and mastoids. Normal external nose.    EARS, OTOSCOPY: The TMs and EACs appeared to be

## 2020-05-30 ENCOUNTER — HOSPITAL ENCOUNTER (OUTPATIENT)
Dept: ULTRASOUND IMAGING | Age: 70
Discharge: HOME | End: 2020-05-30
Payer: MEDICARE

## 2020-05-30 PROCEDURE — 76536 US EXAM OF HEAD AND NECK: CPT

## 2020-05-31 ENCOUNTER — TELEPHONE (OUTPATIENT)
Dept: ENT CLINIC | Age: 70
End: 2020-05-31

## 2020-05-31 NOTE — TELEPHONE ENCOUNTER
Please call [facility] and schedule for a doxy. me or [facility] virtual visit, with me. Follow-up is needed for thyroid nodule and discuss the thyroid ultrasound findings. If she prefers, this may be done by in-office visit.

## 2020-06-01 ENCOUNTER — TELEPHONE (OUTPATIENT)
Dept: ENT CLINIC | Age: 70
End: 2020-06-01

## 2020-06-02 NOTE — TELEPHONE ENCOUNTER
LVM for patient to return a call back to our office to reschedule appointment on 06/04/2020. Dr. Jennifer Cuellar is in surgery at that time and will not be able to do a VV appointment.

## 2020-06-04 ENCOUNTER — VIRTUAL VISIT (OUTPATIENT)
Dept: ENT CLINIC | Age: 70
End: 2020-06-04
Payer: MEDICARE

## 2020-06-04 PROCEDURE — 1123F ACP DISCUSS/DSCN MKR DOCD: CPT | Performed by: OTOLARYNGOLOGY

## 2020-06-04 PROCEDURE — 99213 OFFICE O/P EST LOW 20 MIN: CPT | Performed by: OTOLARYNGOLOGY

## 2020-06-04 PROCEDURE — G8420 CALC BMI NORM PARAMETERS: HCPCS | Performed by: OTOLARYNGOLOGY

## 2020-06-04 PROCEDURE — G8399 PT W/DXA RESULTS DOCUMENT: HCPCS | Performed by: OTOLARYNGOLOGY

## 2020-06-04 PROCEDURE — 3017F COLORECTAL CA SCREEN DOC REV: CPT | Performed by: OTOLARYNGOLOGY

## 2020-06-04 PROCEDURE — 1036F TOBACCO NON-USER: CPT | Performed by: OTOLARYNGOLOGY

## 2020-06-04 PROCEDURE — G8427 DOCREV CUR MEDS BY ELIG CLIN: HCPCS | Performed by: OTOLARYNGOLOGY

## 2020-06-04 PROCEDURE — 4040F PNEUMOC VAC/ADMIN/RCVD: CPT | Performed by: OTOLARYNGOLOGY

## 2020-06-04 PROCEDURE — 1090F PRES/ABSN URINE INCON ASSESS: CPT | Performed by: OTOLARYNGOLOGY

## 2020-06-04 NOTE — PATIENT INSTRUCTIONS
We discussed the results of your recent thyroid ultrasound which showed a 4.1 cm nodule in the right lobe. We discussed the higher incidence of thyroid cancer in a nodule larger than 4 cm. You stated your thoughts that since the nodule is only slightly larger than 4 cm and previous thyroidectomy surgery showed no cancer, your decision is to continue observation for further enlargement/growth. We discussed the alternatives of management with completion thyroidectomy versus a fine needle aspiration of the right thyroid nodule for cytologic evaluation (\"needle biopsy\") versus observation for further enlargement by periodic ultrasound studies. You expressed your decision and choice of observation with a repeat ultrasound at one year interval, with the next ultrasound on or about May 15, 2021. You also agreed to come into office for a follow-up visit and examination every six months. I discussed the need for continued surveillance of the thyroid nodule and the usual indications for surgical excision. I discussed possible enlargement of the nodule and possible spread and continued growth if it is a thyroid cancer.

## 2020-06-04 NOTE — PROGRESS NOTES
2020    TELEHEALTH EVALUATION -- Audio/Visual (During [facility] public health emergency)      Chief Complaint   Patient presents with    Thyroid Problem     nodule, follow up nodule. HPI:  Carole Rodriguez (:  1950) has requested an audio/video evaluation for the following concern(s):  Thyroid nodule, discuss and follow up on thyroid ultrasound. Prior to Visit Medications    Medication Sig Taking?  Authorizing Provider   Multiple Vitamins-Minerals (THERAPEUTIC MULTIVITAMIN-MINERALS) tablet Take 1 tablet by mouth daily  Historical Provider, MD   montelukast (SINGULAIR) 10 MG tablet Take 10 mg by mouth nightly  Historical Provider, MD   QVAR REDIHALER 40 MCG/ACT AERB inhaler   Historical Provider, MD   atorvastatin (LIPITOR) 20 MG tablet TAKE 1 TABLET BY MOUTH EVERY DAY  Kp Rosas MD   levothyroxine (SYNTHROID) 50 MCG tablet TAKE 1 TABLET BY MOUTH DAILY  Brooklynn Garza MD   estrogens, conjugated, (PREMARIN) 0.3 MG tablet Take 1 tablet by mouth Twice a Week  Brooklynn Garza MD   beclomethasone (QVAR) 40 MCG/ACT inhaler Inhale 2 puffs into the lungs 2 times daily  Brooklynn Garza MD   [facility]  ([address]) MCG/ACT inhaler INHALE 2 PUFFS INTO LUNGS EVERY 6 HOURS AS NEEDED FOR WHEEZING OR SHORTNESS OF [place]   fluticasone (FLOVENT HFA) 220 MCG/ACT inhaler Inhale 1 puff into the lungs 2 times daily  Brooklynn Garza MD   SUMAtriptan (IMITREX) 100 MG tablet TAKE 1 TABLET BY MOUTH ONCE AS NEEDED FOR MIGRAINE; may repeat if needed x 1 in 2 hours  Brooklynn Garza MD   topiramate (TOPAMAX) 25 MG tablet TAKE ONE TABLET BY MOUTH TWICE DAILY  Nahed SANCHEZ Contractor, MD   Diclofenac Potassium 50 MG PACK 1 prn for headache do not exceed 2 in 24 hour period  [employer], MD   calcium carbonate 600 MG TABS tablet Take 1 tablet by mouth daily  Historical Provider, MD   Cholecalciferol (VITAMIN D-3) 1000 UNITS CAPS Take by HISTORY:   ORDERING SYSTEM PROVIDED HISTORY: Thyroid nodule       Ongoing evaluation       FINDINGS:   Right thyroid lobe:  6.2 x 1.8 x 2.7 cm       Left thyroid lobe:  Previous left thyroidectomy       Isthmus:  3 mm       Thyroid Gland:  Mild heterogeneity of the thyroid parenchyma with some   hyperemia.  No mass is seen within the left thyroid bed.       Nodules: There is redemonstration of a complex cystic nodule in the right   lobe of the thyroid.  The nodule measures around 4.1 x 1.8 x 2.9 cm and   consists of several cystic areas with complex internal echogenicity, a   central solid component and some marginal hyperemia.  Previously, this was   measured at 3.8 cm maximally.       NODULE: Right 1       Size: 41 x 18 x 29 mm       Location: Right lobe diffusely       1. Composition:  Mixed cystic and solid (1)       2. Echogenicity:  Isoechoic (1)       3. Shape:  Wider-than-tall (0)       4. Margins:  Smooth (0)       5. Echogenic foci:  None (0)       ACR TI-RADS total points:  2       ACR TI-RADS risk category: TR2       Prior biopsy: Unknown.       Significant growth:  No.       Change in features:  No.       Change in ACR TI-RADS risk category:  No.       Cervical lymphadenopathy: No abnormal lymph nodes in the imaged portions of   the neck.           Impression   Grossly stable predominantly cystic right thyroid nodule as described above.    The nodule is consistent with a TR 2 nodule.  Given the history of previous   thyroidectomy, continued surveillance is recommended with follow-up   ultrasound in 1 year.       NODULE right 1: ACR TI-RADS TR2:       Recommend:  Follow-up ultrasound in 1 year.       ACR TI-RADS recommends that no more than two nodules with the highest ACR   TI-RADS point total should be biopsied and no more than four nodules should   be followed.             COUNSELING FOR THYROID NODULE:  I reviewed the possible etiology of the thyroid nodule visualized on the recent thyroid

## 2020-09-22 ENCOUNTER — HOSPITAL ENCOUNTER (OUTPATIENT)
Dept: GENERAL RADIOLOGY | Age: 70
Discharge: HOME | End: 2020-09-22
Payer: MEDICARE

## 2020-09-22 ENCOUNTER — HOSPITAL ENCOUNTER (OUTPATIENT)
Dept: WOMENS IMAGING | Age: 70
Discharge: HOME | End: 2020-09-22
Payer: MEDICARE

## 2020-09-22 PROCEDURE — 77063 BREAST TOMOSYNTHESIS BI: CPT

## 2020-09-22 PROCEDURE — 77080 DXA BONE DENSITY AXIAL: CPT

## 2020-10-15 ENCOUNTER — OFFICE VISIT (OUTPATIENT)
Dept: ENT CLINIC | Age: 70
End: 2020-10-15
Payer: MEDICARE

## 2020-10-15 VITALS — TEMPERATURE: 97.8 F | DIASTOLIC BLOOD PRESSURE: 79 MMHG | HEART RATE: 83 BPM | SYSTOLIC BLOOD PRESSURE: 130 MMHG

## 2020-10-15 PROCEDURE — G8420 CALC BMI NORM PARAMETERS: HCPCS | Performed by: OTOLARYNGOLOGY

## 2020-10-15 PROCEDURE — G8484 FLU IMMUNIZE NO ADMIN: HCPCS | Performed by: OTOLARYNGOLOGY

## 2020-10-15 PROCEDURE — 4040F PNEUMOC VAC/ADMIN/RCVD: CPT | Performed by: OTOLARYNGOLOGY

## 2020-10-15 PROCEDURE — 3017F COLORECTAL CA SCREEN DOC REV: CPT | Performed by: OTOLARYNGOLOGY

## 2020-10-15 PROCEDURE — G8399 PT W/DXA RESULTS DOCUMENT: HCPCS | Performed by: OTOLARYNGOLOGY

## 2020-10-15 PROCEDURE — 1090F PRES/ABSN URINE INCON ASSESS: CPT | Performed by: OTOLARYNGOLOGY

## 2020-10-15 PROCEDURE — G8427 DOCREV CUR MEDS BY ELIG CLIN: HCPCS | Performed by: OTOLARYNGOLOGY

## 2020-10-15 PROCEDURE — 1036F TOBACCO NON-USER: CPT | Performed by: OTOLARYNGOLOGY

## 2020-10-15 PROCEDURE — 1123F ACP DISCUSS/DSCN MKR DOCD: CPT | Performed by: OTOLARYNGOLOGY

## 2020-10-15 PROCEDURE — 31505 DIAGNOSTIC LARYNGOSCOPY: CPT | Performed by: OTOLARYNGOLOGY

## 2020-10-15 PROCEDURE — 99213 OFFICE O/P EST LOW 20 MIN: CPT | Performed by: OTOLARYNGOLOGY

## 2020-10-15 RX ORDER — ALENDRONATE SODIUM 70 MG/1
TABLET ORAL
COMMUNITY
Start: 2020-10-05

## 2020-10-15 NOTE — PROGRESS NOTES
[facility]         PCP:  Belinda Oviedo III, DO      CHIEF COMPLAINT:  Chief Complaint   Patient presents with    Thyroid Problem       HISTORY OF PRESENT ILLNESS:   Anamika Pfeiffer is a 79 y.o. female here for recheck and follow-up of a problem located in the thyroid gland. The quality is a mixed cystic and solid nodule. She is asymptomatic for the thyroid nodule. REVIEW OF SYSTEMS:   CONSTITUTIONAL:  Denied fever. Denied chills. Denied unexplained weight loss, over 20 pounds in the past six months. EARS, NOSE, THROAT:  (+) tinnitus, which she stated is related to her migraine medication. Denied pain drainage. otorrhea, otalgia, hearing loss, epistaxis, nasal dyspnea, rhinorrhea, sore throat and chronic hoarseness. PAST MEDICAL HISTORY:  Past Medical History:   Diagnosis Date    Asthma     Crohn disease ([address]     Headache(784.0)     Hyperlipidemia 7/28/2016    Migraine     Thyroid disease         Past Surgical History:   Procedure Laterality Date    APPENDECTOMY  1993    CERVICAL FUSION  2002, 2011 [address]    HYSTERECTOMY      Total; Cervical cancer    THYROIDECTOMY, PARTIAL  2010    TONSILLECTOMY      TUBAL LIGATION  1979    TYMPANOPLASTY              EXAMINATION:      Vitals:    10/15/20 1107   BP: 130/79   Pulse: 83   Temp: 97.8 °F (36.6 °C)      VITALS SIGNS were reviewed. GENERAL APPEARANCE:  Well developed, well nourished, no apparent distress, no apparent deformities. ABILITY TO COMMUNICATE/QUALITY OF VOICE:  Communicated without difficulty. Normal voice. No hot potato voice. No hoarseness. EYES:  Extraocular motion was intact, bilaterally. Normal primary gaze alignment. Sclera and conjunctiva clear bilaterally. SALIVARY GLANDS:  The parotid, submandibular, and sublingual glands were normal bilaterally. EXTERNAL EAR/NOSE:  Normal pinnae and mastoids. Normal external nose.

## 2021-05-07 ENCOUNTER — TELEPHONE (OUTPATIENT)
Dept: ENT CLINIC | Age: 71
End: 2021-05-07

## 2021-05-07 DIAGNOSIS — E04.1 THYROID NODULE: ICD-10-CM

## 2021-05-07 DIAGNOSIS — R49.0 HOARSENESS OF VOICE: Primary | ICD-10-CM

## 2021-05-07 DIAGNOSIS — E04.9 GOITER: ICD-10-CM

## 2021-05-07 NOTE — TELEPHONE ENCOUNTER
[facility]  called requesting an order for a Thyroid US     Patient is scheduled for one on Monday 10, at 10:30     Dr Stephan Mckeon wants patient to have one Annually , she had one last May 31, 2020 please advise

## 2021-05-10 ENCOUNTER — HOSPITAL ENCOUNTER (OUTPATIENT)
Dept: ULTRASOUND IMAGING | Age: 71
Discharge: HOME | End: 2021-05-10
Payer: MEDICARE

## 2021-05-10 DIAGNOSIS — E04.1 THYROID NODULE: ICD-10-CM

## 2021-05-10 DIAGNOSIS — R49.0 HOARSENESS OF VOICE: ICD-10-CM

## 2021-05-10 DIAGNOSIS — E04.9 GOITER: ICD-10-CM

## 2021-05-10 PROCEDURE — 76536 US EXAM OF HEAD AND NECK: CPT

## 2021-05-13 ENCOUNTER — OFFICE VISIT (OUTPATIENT)
Dept: ENT CLINIC | Age: 71
End: 2021-05-13
Payer: MEDICARE

## 2021-05-13 VITALS — HEART RATE: 76 BPM | SYSTOLIC BLOOD PRESSURE: 128 MMHG | DIASTOLIC BLOOD PRESSURE: 82 MMHG

## 2021-05-13 DIAGNOSIS — E04.1 THYROID NODULE: ICD-10-CM

## 2021-05-13 DIAGNOSIS — J38.3 AGE-RELATED VOCAL CORD ATROPHY: ICD-10-CM

## 2021-05-13 DIAGNOSIS — T17.308A CHOKING, INITIAL ENCOUNTER: ICD-10-CM

## 2021-05-13 DIAGNOSIS — H90.3 SENSORINEURAL HEARING LOSS (SNHL) OF BOTH EARS: Primary | ICD-10-CM

## 2021-05-13 DIAGNOSIS — R13.10 DYSPHAGIA, UNSPECIFIED TYPE: ICD-10-CM

## 2021-05-13 DIAGNOSIS — H93.13 SUBJECTIVE TINNITUS OF BOTH EARS: ICD-10-CM

## 2021-05-13 DIAGNOSIS — R49.0 HOARSENESS OF VOICE: ICD-10-CM

## 2021-05-13 PROCEDURE — 1123F ACP DISCUSS/DSCN MKR DOCD: CPT | Performed by: OTOLARYNGOLOGY

## 2021-05-13 PROCEDURE — G8427 DOCREV CUR MEDS BY ELIG CLIN: HCPCS | Performed by: OTOLARYNGOLOGY

## 2021-05-13 PROCEDURE — G8399 PT W/DXA RESULTS DOCUMENT: HCPCS | Performed by: OTOLARYNGOLOGY

## 2021-05-13 PROCEDURE — 1036F TOBACCO NON-USER: CPT | Performed by: OTOLARYNGOLOGY

## 2021-05-13 PROCEDURE — 99214 OFFICE O/P EST MOD 30 MIN: CPT | Performed by: OTOLARYNGOLOGY

## 2021-05-13 PROCEDURE — G8420 CALC BMI NORM PARAMETERS: HCPCS | Performed by: OTOLARYNGOLOGY

## 2021-05-13 PROCEDURE — 3017F COLORECTAL CA SCREEN DOC REV: CPT | Performed by: OTOLARYNGOLOGY

## 2021-05-13 PROCEDURE — 4040F PNEUMOC VAC/ADMIN/RCVD: CPT | Performed by: OTOLARYNGOLOGY

## 2021-05-13 PROCEDURE — 1090F PRES/ABSN URINE INCON ASSESS: CPT | Performed by: OTOLARYNGOLOGY

## 2021-05-13 ASSESSMENT — ENCOUNTER SYMPTOMS
SORE THROAT: 0
TROUBLE SWALLOWING: 1
VOICE CHANGE: 1
RHINORRHEA: 0

## 2021-05-13 NOTE — PROGRESS NOTES
[facility]       PCP:  Karthik Conway III, DO      CHIEF COMPLAINT  Chief Complaint   Patient presents with    Thyroid Problem     nodules       HISTORY OF PRESENT ILLNESS       Antwon Cunningham is a 79 y.o. female here for recheck and follow up of thyroid. REVIEW OF SYSTEMS   Review of Systems   Constitutional: Negative for chills, fever and unexpected weight change. HENT: Positive for tinnitus (from my migraines, medication, both ears, for about 5 years, no recent changes, obnly notice when surroundings are \"real quiet\"), trouble swallowing (for about 6 months) and voice change (hoarseness off and on for one year). Negative for congestion, ear discharge, ear pain, hearing loss, rhinorrhea and sore throat. PAST MEDICAL HISTORY    Past Medical History:   Diagnosis Date    Asthma     Crohn disease ([address]     Headache(784.0)     Hyperlipidemia 7/28/2016    Migraine     Thyroid disease          Past Surgical History:   Procedure Laterality Date    APPENDECTOMY  1993    CERVICAL FUSION  2002, 2011    CHOLECYSTECTOMY  1998    HYSTERECTOMY      Total; Cervical cancer    THYROIDECTOMY, PARTIAL  2010    TONSILLECTOMY      TUBAL LIGATION  1979    TYMPANOPLASTY           EXAMINATION      Vitals:    05/13/21 1314   BP: 128/82   Site: Left Upper Arm   Position: Sitting   Cuff Size: Medium Adult   Pulse: 76         Physical Exam  Vitals reviewed. Constitutional:       General: She is awake. She is not in acute distress. Appearance: Normal appearance. She is well-developed. She is not ill-appearing or toxic-appearing. HENT:      Head: Normocephalic and atraumatic. Salivary Glands: Right salivary gland is not diffusely enlarged or tender. Left salivary gland is not diffusely enlarged or tender.       Right Ear: Tympanic membrane, ear canal and external ear normal.      Left Ear: Tympanic membrane, ear canal and external ear normal.      Nose: Nose normal. No nasal deformity, septal deviation, mucosal edema, congestion or rhinorrhea. Right Turbinates: Not enlarged. Left Turbinates: Not enlarged. Right Sinus: No maxillary sinus tenderness or frontal sinus tenderness. Left Sinus: No maxillary sinus tenderness or frontal sinus tenderness. Mouth/Throat:      Lips: Pink. No lesions. Mouth: Mucous membranes are moist. No oral lesions. Tongue: No lesions. Palate: No mass and lesions. Pharynx: Oropharynx is clear. Uvula midline. No oropharyngeal exudate or posterior oropharyngeal erythema. Tonsils: 0 on the right. 0 on the left. Comments: Post tonsillectomy. INDIRECT LARYNGOSCOPY:  There was mild bilateral vocal cord atrophy with bowing of the vocal cords, consistent with senile vocal cord atrophy. Otherwise, the vocal cords were normally mobile bilaterally with midline approximation, with a 1 to 2 mm glottic gap, on phonation. The epiglottis, supraglottis, false vocal cords, true vocal cords, mobility of the larynx, base of tongue, subglottis, and piriform sinuses appeared to be normal.    Neck:      Thyroid: Thyromegaly (right lobe) present. No thyroid mass or thyroid tenderness. Trachea: No tracheal deviation. Musculoskeletal:      Cervical back: Normal range of motion and neck supple. No rigidity. No muscular tenderness. Lymphadenopathy:      Cervical: No cervical adenopathy. Neurological:      Mental Status: She is alert.                REVIEW OF IMAGING           Narrative   EXAMINATION:   THYROID ULTRASOUND       5/10/2021     FINDINGS:   Right thyroid lobe:  5.5 x 2.4 x 1.6 cm       Left thyroid lobe:  Removed       Isthmus:  3 mm       Multiple confluent nodules within the right lobe are again noted, no   definitive significant change, best compared on the cine loop images.  These   nodules are partly cystic and spongiform, the largest nodule

## 2021-05-20 ENCOUNTER — HOSPITAL ENCOUNTER (OUTPATIENT)
Dept: GENERAL RADIOLOGY | Age: 71
Discharge: HOME | End: 2021-05-20
Payer: MEDICARE

## 2021-05-20 ENCOUNTER — HOSPITAL ENCOUNTER (OUTPATIENT)
Dept: SPEECH THERAPY | Age: 71
Setting detail: THERAPIES SERIES
Discharge: HOME | End: 2021-05-20
Payer: MEDICARE

## 2021-05-20 DIAGNOSIS — T17.308A CHOKING, INITIAL ENCOUNTER: ICD-10-CM

## 2021-05-20 DIAGNOSIS — R13.10 DYSPHAGIA, UNSPECIFIED TYPE: ICD-10-CM

## 2021-05-20 PROCEDURE — 92611 MOTION FLUOROSCOPY/SWALLOW: CPT

## 2021-05-20 PROCEDURE — 92526 ORAL FUNCTION THERAPY: CPT

## 2021-05-20 PROCEDURE — 74230 X-RAY XM SWLNG FUNCJ C+: CPT

## 2021-05-20 NOTE — PROCEDURES
[address]  MODIFIED BARIUM SWALLOW EVALUATION    Patient's Name: Renita Bailon. O.B: 1950  Medical Diagnosis: No admission diagnoses are documented for this encounter. Treatment Diagnosis: Dysphagia  Ordering MD: Dr. Madi Durham  Radiologist: Dr. Amadeo Barnes  Date of Onset: 5/13/21  Date of Evaluation: 5/20/2021  Type of Study: Modified Barium Swallowing Study (MBS)  Diet Prior to Study:  Regular diet with thin liquids  Pain Level: Pt denies pain at this time    Impression:  Modified Barium Swallow evaluation completed on 5/20/2021. Results indicate mild oropharyngeal dysphagia with no overt laryngeal penetration or aspiration noted with any texture throughout this study. Premature bolus loss to pharynx with mild delayed swallow initiation noted with all textures. Mild stasis after the swallow is also noted with all textures but is slightly more pronounced with soft solids and solids. Pt does demonstrate a mild delayed spontaneous second swallow which is effective in clearing pharyngeal stasis with all textures. However, when Pt is permitted to self feed a liquid rinse following a solid texture, Pt is noted to demonstrate intermittent \"flash\" laryngeal penetration of thin liquid rinse taken prior to spontaneous pharyngeal clearing of solid texture pharyngeal residue. Noted mild pharyngeal stasis after the swallow prior to second swallow to clear, as well as noted \"flash\" laryngeal penetration with a thin liquid \"rinse\", likely contribute to Pt's sensation of solid textures becoming \"stuck\" in her throat. Education regarding aspiration risk and precautions as well as compensatory strategies to minimize \"flash\" laryngeal penetration with thin liquids were explained to the Pt following this study.  Pt verbalized understanding and agreement with recommendations    Aspiration/Penetration Risk:  Mild risk with thin liquid rinse taken prior to pharyngeal clearing with solids    Recommendations:    Diet Level: Continue regular texture diet with thin liquids/no straws/meds with puree    Referral: IF symptoms of swallowing difficulty do not improve with compensatory recommendations, Outpatient Speech Therapy for Dysphagia Treatment should be considered at that time    Consistencies given: Thin, Mildly Thick (Nectar) Liquids, Moderately Thick (honey) Liquids, Puree, Soft solid, Solid    Oral Phase  -Reduced bolus control  -Premature bolus loss to pharynx  -Mild reduced A-P bolus propulsion  -mild oral stasis with all textures    Pharyngeal Phase  -Mild delayed swallow onset; 2-3 sec delay with puree/soft solids/solids  -Pooling to the underside of epiglottis with thins; pooling to valleculae with all  -Mild stasis after the swallow with all textures; spontaneous second swallow effectively clears stasis with all textures  -mild delayed and reduced epiglottic inversion  -mild reduced anterior hyoid excursion  -flash/self clearing laryngeal penetration with thins via straw and with thin liquid rinse  -No overt laryngeal penetration or aspiration directly viewed    Esophageal Phase  Unremarkable    Following Evaluation:  Results/recommendations and education given to Patient, who verbalized understanding    Timed Code Treatment: 0 min    Total Treatment Time: 60 min    Lalithae Bras [id number]     Speech-Language Pathologist

## 2021-06-08 ENCOUNTER — PROCEDURE VISIT (OUTPATIENT)
Dept: AUDIOLOGY | Age: 71
End: 2021-06-08
Payer: MEDICARE

## 2021-06-08 DIAGNOSIS — H90.3 SENSORINEURAL HEARING LOSS (SNHL) OF BOTH EARS: Primary | ICD-10-CM

## 2021-06-08 PROCEDURE — 92567 TYMPANOMETRY: CPT | Performed by: AUDIOLOGIST

## 2021-06-08 PROCEDURE — 92557 COMPREHENSIVE HEARING TEST: CPT | Performed by: AUDIOLOGIST

## 2021-06-08 NOTE — PROGRESS NOTES
[address] [facility]    6/8/2021     PCP: Luther Brown DO   MRN: [id number]     AUDIOLOGIC AND OTHER PERTINENT MEDICAL HISTORY:        Ms. Stefanie Collins was seen for hearing and middle ear evaluation. Otolaryngology is referral source of today's appointment. Patient presents with left sided hearing loss. Last audiogram was in 5/2019 which showed: mild to moderate sensory loss, with excellent discrimination, AU. Hx of left sided tympanoplasty performed, on two occasions- at the ages of 5 and 25. AUDIOGRAM/IMMITTANCE (MIDDLE EAR FUNCTION):        Audiogram demonstrates normal-moderate sensory loss of both ears, slightly worse on left (20 dB air/bone gap at 4 kHz). Speech discrimination ability was excellent in quiet, at elevated levels. Immittance consistent with normal middle ear function (Type A curve) of left, Hypermobile movement of tympanic membrane (Type Ad) from right ear. Ipsilateral acoustic reflexes were present from right ear, absent from left- consistent with degree of hearing impairment. Chart cc'd to: Jordana Lane MD    COUNSELING:          Audiogram results were reviewed with patient.             RECOMMENDATIONS:         Return on annual basis to monitor hearing levels  ENT to medically advise     Electronically signed by Kali Foster on 6/8/21 at 11:16 AM.

## 2021-09-03 NOTE — FLOWSHEET NOTE
OFFICE VISIT NOTE:    Darren Shay is a 78 y.o. male who presents today for Diabetes (3 month diabetic OV).     A1c 6.6% today.     Right great toe paronychial area beginning, and left groin area proximal medial thigh with infected seb cyst that drains on occasion.     Diabetes  He presents for his follow-up diabetic visit. He has type 2 diabetes mellitus. His disease course has been stable. There are no hypoglycemic associated symptoms. There are no diabetic associated symptoms. Pertinent negatives for diabetes include no chest pain and no fatigue. There are no hypoglycemic complications. Symptoms are stable. There are no diabetic complications. Current diabetic treatment includes diet. He is compliant with treatment all of the time. His weight is stable. He is following a diabetic and low fat/cholesterol diet. Meal planning includes avoidance of concentrated sweets. He participates in exercise intermittently. There is no change in his home blood glucose trend. Eye exam is current.        Past medical/surgical history, Family history, Social history, Allergies and Medications have been reviewed with the patient today and are updated in [facility] EMR. See below.  Past Medical History:   Diagnosis Date   • Cataracts, bilateral    • Colon cancer (CMS/HCC)    • Coronary artery disease    • Diabetes mellitus (CMS/HCC)    • Diabetic foot ulcers (CMS/HCC)    • Hypertension    • Ileostomy present (CMS/HCC)    • Neuropathy    • UC (ulcerative colitis confined to rectum) (CMS/HCC)      Past Surgical History:   Procedure Laterality Date   • CHOLECYSTECTOMY     • COLOSTOMY     • CORONARY ARTERY BYPASS GRAFT  1993    x5   • ORIF FOOT FRACTURE Right 1/17/2019    Procedure: PARTIAL REMOVAL OF BONE MEDIAL CUNEIFORM AND 1ST METATARSAL - RIGHT FOOT EXCISION OF ULCERATION;  Surgeon: Florin Kearns DPM;  Location: [facility] OR;  Service: Podiatry   • TOE AMPUTATION      left foot no toes at      Family History   Problem Relation Age of  Speech-Language Pathology  Daily Treatment Note    Date:  2019    Patient Name:  Onita Dakins    :  1950  MRN: [id number]  Restrictions/Precautions:    Diagnosis:  Hoarseness of voice (R49.0)  Treatment Diagnosis:  Moderate Dysphonia  Insurance/Certification information:  Medicare  Referring Physician:  Dr. Brayan Mancia of care signed (Y/N):  Y; 19   Visit# / total visits:    Pain level: 0/10  Medicare $ including today's session: $621.76      Progress Note: []  Yes  [x]  No  Next due by: Visit #7/10     Subjective:  Pt reports noticing improvements in her voice. Pt able to identify that her \"outside voice\" is how he normal voice should sound. Pt reports completing voice exercises as instructed at home. Pt had injury to back last week and is now on muscle relaxers. Objective:   1.) Patient will participate in vocal strengthening exercises for improved vocal quality with no cues. -Pt instructed in relaxation exercises x2 completed x5 repetitions each. -Pt trained in resonant voice therapy to achieve front focused voice and decreased laryngeal tension.    -Focus on Vocal Function exercises this date as follows:  -Pt completed glides up and down x15 each  -Pt completed warm-up exercises x5, sustaining \"e\" on note C: average 12 seconds   -C:average 13.5 seconds   -D: average 18.7 seconds   -E:average 16.8 seconds   -F: average 17.1 seconds  -Pt required min-mod verbal cues to stay relaxed and use resonant voice throughout.  -Pt again encouraged to take a breath in through nose and open mouth wide during vocal function exercises    2.) Patient will increase maximum phonation time to > 20 seconds. -Max phonation time: 16.5 seconds    3.) Pt will increase vocal intensity to >65 dB in conversation with no cues. -Pt with adequate volume in conversation when using pt's \"outdoor voice. \"    Assessment: Pt making adequate progress towards goals.  Pt again with improved max phonation "Onset   • Cancer Mother    • Heart disease Mother    • Diabetes Mother    • Cancer Father    • Diabetes Sister    • No Known Problems Brother    • Diabetes Sister    • No Known Problems Brother    • No Known Problems Brother    • No Known Problems Brother      Social History     Tobacco Use   • Smoking status: Current Every Day Smoker     Years: 10.00     Types: Pipe   • Smokeless tobacco: Never Used   Vaping Use   • Vaping Use: Never used   Substance Use Topics   • Alcohol use: No   • Drug use: No       ALLERGIES:  Cephalexin    CURRENT MEDS:    Current Outpatient Medications:   •  Accu-Chek Jessica Plus test strip, TEST BLOOD SUGAR THREE TIMES DAILY, Disp: 400 each, Rfl: 3  •  aspirin 81 MG chewable tablet, Chew 81 mg Daily., Disp: , Rfl:   •  cilostazol (PLETAL) 100 MG tablet, TAKE 1 TABLET TWICE DAILY, Disp: 180 tablet, Rfl: 3  •  clopidogrel (PLAVIX) 75 MG tablet, TAKE 1 TABLET EVERY DAY, Disp: 30 tablet, Rfl: 0  •  Droplet Insulin Syringe 31G X 5/16\" 1 ML misc, USE THREE TIMES DAILY AS DIRECTED, Disp: 300 each, Rfl: 5  •  folic acid (FOLVITE) 400 MCG tablet, Take 800 mcg by mouth Daily., Disp: , Rfl:   •  gabapentin (NEURONTIN) 300 MG capsule, Take 1 capsule by mouth 2 (two) times a day., Disp: 180 capsule, Rfl: 1  •  insulin aspart (novoLOG) 100 UNIT/ML injection, Inject 30 units 4 times daily plus additional units per sliding scale. (Patient taking differently: Inject 40 units 4 times daily plus additional units per sliding scale.), Disp: 300 mL, Rfl: 2  •  Lantus 100 UNIT/ML injection, INJECT 82 UNITS UNDER THE SKIN IN THE APPROPRIATE AREA AS DIRECTED EVERY NIGHT., Disp: 70 mL, Rfl: 1  •  metoprolol tartrate (LOPRESSOR) 50 MG tablet, TAKE 1 TABLET TWICE DAILY, Disp: 180 tablet, Rfl: 1  •  simvastatin (ZOCOR) 20 MG tablet, TAKE 1 TABLET AT BEDTIME, Disp: 90 tablet, Rfl: 1  •  pantoprazole (PROTONIX) 40 MG EC tablet, TAKE 1 TABLET EVERY DAY, Disp: 90 tablet, Rfl: 0  •  sulfamethoxazole-trimethoprim (BACTRIM " "DS,SEPTRA DS) 800-160 MG per tablet, Take 1 tablet by mouth 2 (Two) Times a Day., Disp: 20 tablet, Rfl: 0    REVIEW OF SYSTEMS:  Review of Systems   Constitutional: Negative for activity change, fatigue and fever.   Respiratory: Negative for shortness of breath.    Cardiovascular: Negative for chest pain.   Neurological: Negative for headache.       PHYSICAL EXAMINATION:  Vital Signs:  /67 (BP Location: Left arm, Patient Position: Sitting, Cuff Size: Large Adult)   Pulse 83   Temp 98.1 °F (36.7 °C) (Infrared)   Ht 182.9 cm (72.01\")   Wt 97.4 kg (214 lb 12.8 oz)   SpO2 99%   BMI 29.13 kg/m²   Vitals:    09/03/21 1322   Patient Position: Sitting       Physical Exam  Vitals and nursing note reviewed.   Constitutional:       General: He is not in acute distress.     Appearance: He is well-developed.   HENT:      Head: Normocephalic and atraumatic.      Mouth/Throat:      Mouth: Mucous membranes are moist.      Pharynx: Oropharynx is clear.   Eyes:      Extraocular Movements: Extraocular movements intact.      Pupils: Pupils are equal, round, and reactive to light.   Neck:      Vascular: No JVD.   Cardiovascular:      Rate and Rhythm: Normal rate and regular rhythm.      Pulses: Normal pulses.      Heart sounds: Normal heart sounds.   Pulmonary:      Effort: Pulmonary effort is normal. No respiratory distress.      Breath sounds: Normal breath sounds.   Musculoskeletal:         General: Normal range of motion.      Cervical back: Normal range of motion and neck supple.   Skin:     General: Skin is warm and dry.      Capillary Refill: Capillary refill takes less than 2 seconds.      Findings: No rash.   Neurological:      General: No focal deficit present.      Mental Status: He is alert and oriented to person, place, and time.      Cranial Nerves: No cranial nerve deficit.   Psychiatric:         Mood and Affect: Mood normal.         Behavior: Behavior normal.         Procedures    ASSESSMENT/ PLAN:  Problem " List Items Addressed This Visit        Endocrine and Metabolic    Diabetes mellitus (CMS/HCC) - Primary    Relevant Orders    POC Glycosylated Hemoglobin (Hb A1C) (Completed)      Other Visit Diagnoses     Cellulitis of toe of right foot        Relevant Medications    sulfamethoxazole-trimethoprim (BACTRIM DS,SEPTRA DS) 800-160 MG per tablet    Infected sebaceous cyst        Relevant Medications    sulfamethoxazole-trimethoprim (BACTRIM DS,SEPTRA DS) 800-160 MG per tablet            Specific Patient Instructions:  MEDICATION Instructions: Encouraged patient to continue routine medicines as prescribed and maintain compliance. Patient instructed to report any adverse side effects or reactions to medicines promptly to the office. Patient instructed to make us aware of any OTC or herbal meds or supplement use.    DIET Recommendations: Patient instructed and provided information on the following nutrition and diet recommendations: Calorie restriction for weight reduction and maintenance. Necessity for adequate daily intake of fluids/water. Also, diet information provided in AVS for specifics.    EXERCISE Instructions: Discussed with patient the need for routine aerobic activity for cardiovascular fitness, 3 times a week for about 30 minutes. Daily exercise for increased fitness and weight reduction goals.    SMOKING Recommendations: Counseled patient and encouraged them on smoking and tobacco cessation if applicable. Discussed the benefits to all body systems with smoking/tobacco cessation, including decreased cardiac/lung/stroke/cancer risk. Encouraged no vaping use.    HEALTH MAINTENANCE:  Counseling provided to patient/family about routine health maintenance and ANNUAL physicals/labs. Counseling on recommended Vaccinations appropriate for age needed.        Patient's Body mass index is 29.13 kg/m². indicating that he is overweight (BMI 25-29.9). Obesity-related health conditions include the following: hypertension,  diabetes mellitus, peripheral vascular disease, osteoarthritis and lower extremity venous stasis disease. Obesity is unchanged. BMI is is above average; BMI management plan is completed. We discussed portion control and increasing exercise..      Medications or Orders placed this visit:  Orders Placed This Encounter   Procedures   • POC Glycosylated Hemoglobin (Hb A1C)     Order Specific Question:   Release to patient     Answer:   Immediate       Medications DISCONTINUED this visit:  Medications Discontinued During This Encounter   Medication Reason   • albuterol sulfate  (90 Base) MCG/ACT inhaler *Therapy completed       FOLLOW-UP:  Return in about 3 months (around 12/3/2021) for Recheck, Next scheduled follow up.    I discussed the patients findings and my recommendations with patient.  An After Visit Summary (AVS) was printed and given to the patient at discharge.        Robin Freedman MD, FAAFP  9/3/2021

## 2021-10-04 ENCOUNTER — OFFICE VISIT (OUTPATIENT)
Dept: ENT CLINIC | Age: 71
End: 2021-10-04
Payer: MEDICARE

## 2021-10-04 VITALS
WEIGHT: 141 LBS | HEIGHT: 65 IN | HEART RATE: 80 BPM | SYSTOLIC BLOOD PRESSURE: 134 MMHG | BODY MASS INDEX: 23.49 KG/M2 | DIASTOLIC BLOOD PRESSURE: 76 MMHG

## 2021-10-04 DIAGNOSIS — J38.3 AGE-RELATED VOCAL CORD ATROPHY: Chronic | ICD-10-CM

## 2021-10-04 DIAGNOSIS — E04.1 THYROID NODULE: Primary | Chronic | ICD-10-CM

## 2021-10-04 PROCEDURE — G8420 CALC BMI NORM PARAMETERS: HCPCS | Performed by: OTOLARYNGOLOGY

## 2021-10-04 PROCEDURE — 3017F COLORECTAL CA SCREEN DOC REV: CPT | Performed by: OTOLARYNGOLOGY

## 2021-10-04 PROCEDURE — G8484 FLU IMMUNIZE NO ADMIN: HCPCS | Performed by: OTOLARYNGOLOGY

## 2021-10-04 PROCEDURE — 1036F TOBACCO NON-USER: CPT | Performed by: OTOLARYNGOLOGY

## 2021-10-04 PROCEDURE — 31505 DIAGNOSTIC LARYNGOSCOPY: CPT | Performed by: OTOLARYNGOLOGY

## 2021-10-04 PROCEDURE — G8399 PT W/DXA RESULTS DOCUMENT: HCPCS | Performed by: OTOLARYNGOLOGY

## 2021-10-04 PROCEDURE — G8427 DOCREV CUR MEDS BY ELIG CLIN: HCPCS | Performed by: OTOLARYNGOLOGY

## 2021-10-04 PROCEDURE — 4040F PNEUMOC VAC/ADMIN/RCVD: CPT | Performed by: OTOLARYNGOLOGY

## 2021-10-04 PROCEDURE — 1090F PRES/ABSN URINE INCON ASSESS: CPT | Performed by: OTOLARYNGOLOGY

## 2021-10-04 PROCEDURE — 1123F ACP DISCUSS/DSCN MKR DOCD: CPT | Performed by: OTOLARYNGOLOGY

## 2021-10-04 NOTE — PROGRESS NOTES
[facility]       PCP:  Silvano Schmitt DO      CHIEF COMPLAINT  Chief Complaint   Patient presents with    Dysphagia    Thyroid Problem     nodule       HISTORY OF PRESENT ILLNESS       Marianna Moyer is a 70 y.o. female here for recheck and follow up of dysphagia.  swallowing has improved, no difficulty swallowing currently and only one episode while eating out and tried to swallow quickly. PAST MEDICAL HISTORY    Past Medical History:   Diagnosis Date    Asthma     Crohn disease ([address]     Headache(784.0)     Hyperlipidemia 7/28/2016    Migraine     Thyroid disease          Past Surgical History:   Procedure Laterality Date    APPENDECTOMY  1993    CERVICAL FUSION  2002, 2011   [address]    HYSTERECTOMY      Total; Cervical cancer    THYROIDECTOMY, PARTIAL  2010    TONSILLECTOMY      TUBAL LIGATION  1979    TYMPANOPLASTY           EXAMINATION    Vitals:    10/04/21 1113   BP: 134/76   Site: Right Upper Arm   Pulse: 80   Weight: 141 lb (64 kg)   Height: 5' 5\" (1.651 m)       Physical Exam  Vitals reviewed. Constitutional:       General: She is awake. She is not in acute distress. Appearance: Normal appearance. She is well-developed. She is not ill-appearing or toxic-appearing. HENT:      Head: Normocephalic and atraumatic. Salivary Glands: Right salivary gland is not diffusely enlarged or tender. Left salivary gland is not diffusely enlarged or tender. Right Ear: Tympanic membrane, ear canal and external ear normal.      Left Ear: Ear canal and external ear normal.      Ears:      Comments: There was patchy sclerosis, thickening, and post surgical changes of left TM intact with no erythema or WAYNE. Nose: Septal deviation (mild leftward) present. No nasal deformity, mucosal edema, congestion or rhinorrhea. Right Turbinates: Not enlarged.       Left Turbinates: Not enlarged. Right Sinus: No maxillary sinus tenderness or frontal sinus tenderness. Left Sinus: No maxillary sinus tenderness or frontal sinus tenderness. Mouth/Throat:      Lips: Pink. No lesions. Mouth: Mucous membranes are moist. No oral lesions. Tongue: No lesions. Palate: No mass and lesions. Pharynx: Oropharynx is clear. Uvula midline. No oropharyngeal exudate or posterior oropharyngeal erythema. Tonsils: 0 on the right. 0 on the left. Comments: Post-tonsillectomy. Neck:      Thyroid: Thyromegaly (right, nodular) present. No thyroid mass or thyroid tenderness. Trachea: No tracheal deviation. Comments: No cervical masses or tenderness. Musculoskeletal:      Cervical back: Normal range of motion and neck supple. Lymphadenopathy:      Cervical: No cervical adenopathy. Neurological:      Mental Status: She is alert. NOE / Shamika Melendez / Racheal Shah was seen today for dysphagia and thyroid problem. Diagnoses and all orders for this visit:    Thyroid nodule    Age-related vocal cord atrophy           RECOMMENDATIONS/PLAN     1. Patient will be going to [place] for the winter and will be unavailable to dome in for follow up until she returns in May 2021.  2. Repeat thyroid ultrasound in May 2022.  3. Return in about 30 weeks (around 5/2/2022) for recheck, follow-up thyroid, and sooner if condition worsens.

## 2021-10-07 ENCOUNTER — HOSPITAL ENCOUNTER (OUTPATIENT)
Dept: WOMENS IMAGING | Age: 71
Discharge: HOME | End: 2021-10-07
Payer: MEDICARE

## 2021-10-07 DIAGNOSIS — Z12.31 BREAST CANCER SCREENING BY MAMMOGRAM: ICD-10-CM

## 2021-10-07 PROCEDURE — 77063 BREAST TOMOSYNTHESIS BI: CPT

## 2022-05-17 ENCOUNTER — OFFICE VISIT (OUTPATIENT)
Dept: ENT CLINIC | Age: 72
End: 2022-05-17
Payer: MEDICARE

## 2022-05-17 VITALS
HEART RATE: 78 BPM | DIASTOLIC BLOOD PRESSURE: 78 MMHG | SYSTOLIC BLOOD PRESSURE: 138 MMHG | OXYGEN SATURATION: 96 % | TEMPERATURE: 97.1 F

## 2022-05-17 DIAGNOSIS — E04.1 THYROID NODULE: Primary | Chronic | ICD-10-CM

## 2022-05-17 DIAGNOSIS — J38.3 AGE-RELATED VOCAL CORD ATROPHY: Chronic | ICD-10-CM

## 2022-05-17 DIAGNOSIS — R13.10 DYSPHAGIA, UNSPECIFIED TYPE: Chronic | ICD-10-CM

## 2022-05-17 PROCEDURE — 99212 OFFICE O/P EST SF 10 MIN: CPT | Performed by: OTOLARYNGOLOGY

## 2022-05-17 PROCEDURE — 1123F ACP DISCUSS/DSCN MKR DOCD: CPT | Performed by: OTOLARYNGOLOGY

## 2022-05-17 PROCEDURE — G8420 CALC BMI NORM PARAMETERS: HCPCS | Performed by: OTOLARYNGOLOGY

## 2022-05-17 PROCEDURE — G8427 DOCREV CUR MEDS BY ELIG CLIN: HCPCS | Performed by: OTOLARYNGOLOGY

## 2022-05-17 PROCEDURE — 1036F TOBACCO NON-USER: CPT | Performed by: OTOLARYNGOLOGY

## 2022-05-17 PROCEDURE — G8399 PT W/DXA RESULTS DOCUMENT: HCPCS | Performed by: OTOLARYNGOLOGY

## 2022-05-17 PROCEDURE — 1090F PRES/ABSN URINE INCON ASSESS: CPT | Performed by: OTOLARYNGOLOGY

## 2022-05-17 PROCEDURE — 3017F COLORECTAL CA SCREEN DOC REV: CPT | Performed by: OTOLARYNGOLOGY

## 2022-05-17 ASSESSMENT — ENCOUNTER SYMPTOMS
SORE THROAT: 0
TROUBLE SWALLOWING: 1
RHINORRHEA: 0
SINUS PAIN: 0

## 2022-05-17 NOTE — PROGRESS NOTES
[facility]         PCP:  Chano Mccoy DO      CHIEF COMPLAINT  Chief Complaint   Patient presents with    Follow-up     having trouble swallowing        HISTORY OF PRESENT ILLNESS       Nahomy Garcia is a 70 y.o. female here for recheck and follow up of thyroid nodule, dysphagia, and age related vocal cord atrophy with hoarseness. No problems swallowing except when eating bread, meat or chicken, if I don't chew it really good it can gag me. Voice is normal, gets raspy if talk a lot. REVIEW OF SYSTEMS   Review of Systems   Constitutional: Negative for chills and fever. HENT: Positive for tinnitus (both ears, no recent changes.  ) and trouble swallowing (see HPI). Negative for ear discharge, ear pain, rhinorrhea, sinus pain and sore throat. PAST MEDICAL HISTORY    Past Medical History:   Diagnosis Date    Asthma     Crohn disease ([address]     Headache(784.0)     Hyperlipidemia 7/28/2016    Migraine     Thyroid disease        Past Surgical History:   Procedure Laterality Date    APPENDECTOMY  1993    CERVICAL FUSION  2002, 2011    CHOLECYSTECTOMY  1998    HYSTERECTOMY      Total; Cervical cancer    THYROIDECTOMY, PARTIAL  2010    TONSILLECTOMY      TUBAL LIGATION  1979    TYMPANOPLASTY           EXAMINATION    Vitals:    05/17/22 1527   BP: 138/78   Site: Right Upper Arm   Position: Sitting   Cuff Size: Medium Adult   Pulse: 78   Temp: 97.1 °F (36.2 °C)   TempSrc: Temporal   SpO2: 96%     General:  WDWN, NAD, alert and oriented  Face: There was no swelling or lesions detected. Voice: Normal with no hoarseness or hot potato voice. Ears:  TMs and EACs appeared to be normal    Nose: The nasal septum, turbinates, secretions, and mucosa appeared to be normal.   Sinuses:  Maxillary and frontal sinuses were nontender to palpation and percussion.     Oral cavity:  Mucosa, secretions, tongue, and gingiva appeared to be normal.   Oropharynx:  Post tonsillectomy. The hard and soft palates, uvula, tongue, posterior oropharyngeal wall, mucosa and secretions appeared to be normal.     Salivary Glands:  Normal bilateral parotid and bilateral submandibular salivary glands. Neck:  No masses or tenderness. Trachea midline. Laryngeal cartilages and hyoid bone normal.    Thyroid:  Nodule right lobe, left lobe not palpable, post left lobectomy. Nontender. Lymph nodes:  No cervical lymphadenopathy. INDIRECT LARYNGOSCOPY:  Vocal cords with bilateral senile atrophy but normally mobile bilaterally with midline approximation on phonation. Otherwise, the epiglottis, supraglottis, false vocal cords,base of tongue, subglottis, and piriform sinuses appeared to be normal.            IMPRESSION / DIAGNOSES / Eliceo King was seen today for follow-up. Diagnoses and all orders for this visit:    Thyroid nodule  -     Cancel: US THYROID    Dysphagia, unspecified type    Age-related vocal cord atrophy         RECOMMENDATIONS/PLAN      1. Patient declined a repeat barium swallow study. 2. Thyroid ultrasound to recheck nodules. 3. Return in about 6 months (around 11/17/2022) for recheck/follow-up, and sooner if condition worsens.

## 2022-05-26 ENCOUNTER — HOSPITAL ENCOUNTER (OUTPATIENT)
Dept: ULTRASOUND IMAGING | Age: 72
Discharge: HOME | End: 2022-05-26
Payer: MEDICARE

## 2022-05-26 DIAGNOSIS — E04.1 THYROID NODULE: ICD-10-CM

## 2022-05-26 PROCEDURE — 76536 US EXAM OF HEAD AND NECK: CPT

## 2022-09-23 NOTE — PROGRESS NOTES
Patient __X_ reached   _____not reached-preop instructions left on voice mail_____________      DATE___9/26/22_____ TIME__1030______ARRIVAL__0900  FEC_______      Nothing to eat or drink after midnight the night before,except for what the prep instructions call for. If you do not have the instructions or do not understand them please contact your doctors office. Follow any instructions your doctors office has given you including what medications to take the AM of your procedure and which ones to hold. You may use your inhalers. If you take a long acting insulin the jenifer prior please cut the dose in half and take no diabetic medications that AM.Follow specific doctors office instructions regarding blood thinners and if they want you to hold and for how long. If you are on a blood thinner and have no instructions please contact the office and ask. Dress comfortably,bring your insurance card,picture ID,and a complete list of medications, including supplements. You must have a responsible adult to stay with you during the procedure,drive you home and stay with you. [facility] phone number [phone] for any questions. OTHER INSTRUCTIONS(if applicable)____take topamax, levothyroxine, inhalers am of procedure_____________________________________________________        VISITOR POLICY(subject to change)    Current visitor policy is 2 visitors per patient. No children allowed. Mask are required. Visiting hours are 8a-8p. Overnight visitors will be at the discretion of the nurse.

## 2022-09-26 ENCOUNTER — HOSPITAL ENCOUNTER (OUTPATIENT)
Age: 72
Setting detail: OUTPATIENT SURGERY
Discharge: HOME HEALTH | End: 2022-09-26
Attending: INTERNAL MEDICINE | Admitting: INTERNAL MEDICINE
Payer: MEDICARE

## 2022-09-26 ENCOUNTER — ANESTHESIA EVENT (OUTPATIENT)
Dept: ENDOSCOPY | Age: 72
End: 2022-09-26
Payer: MEDICARE

## 2022-09-26 ENCOUNTER — ANESTHESIA (OUTPATIENT)
Dept: ENDOSCOPY | Age: 72
End: 2022-09-26
Payer: MEDICARE

## 2022-09-26 VITALS
DIASTOLIC BLOOD PRESSURE: 63 MMHG | BODY MASS INDEX: 22.99 KG/M2 | HEIGHT: 65 IN | OXYGEN SATURATION: 100 % | RESPIRATION RATE: 16 BRPM | WEIGHT: 138 LBS | SYSTOLIC BLOOD PRESSURE: 118 MMHG | TEMPERATURE: 97.2 F | HEART RATE: 73 BPM

## 2022-09-26 PROCEDURE — 2500000003 HC RX 250 WO HCPCS: Performed by: NURSE ANESTHETIST, CERTIFIED REGISTERED

## 2022-09-26 PROCEDURE — 7100000011 HC PHASE II RECOVERY - ADDTL 15 MIN: Performed by: INTERNAL MEDICINE

## 2022-09-26 PROCEDURE — 7100000010 HC PHASE II RECOVERY - FIRST 15 MIN: Performed by: INTERNAL MEDICINE

## 2022-09-26 PROCEDURE — 3700000001 HC ADD 15 MINUTES (ANESTHESIA): Performed by: INTERNAL MEDICINE

## 2022-09-26 PROCEDURE — 3700000000 HC ANESTHESIA ATTENDED CARE: Performed by: INTERNAL MEDICINE

## 2022-09-26 PROCEDURE — 2580000003 HC RX 258: Performed by: ANESTHESIOLOGY

## 2022-09-26 PROCEDURE — 2709999900 HC NON-CHARGEABLE SUPPLY: Performed by: INTERNAL MEDICINE

## 2022-09-26 PROCEDURE — 6360000002 HC RX W HCPCS: Performed by: NURSE ANESTHETIST, CERTIFIED REGISTERED

## 2022-09-26 PROCEDURE — 3609027000 HC COLONOSCOPY: Performed by: INTERNAL MEDICINE

## 2022-09-26 RX ORDER — SODIUM CHLORIDE 9 MG/ML
INJECTION, SOLUTION INTRAVENOUS CONTINUOUS
Status: DISCONTINUED | OUTPATIENT
Start: 2022-09-26 | End: 2022-09-26 | Stop reason: HOSPADM

## 2022-09-26 RX ORDER — BACLOFEN 10 MG/1
10 TABLET ORAL 3 TIMES DAILY
COMMUNITY

## 2022-09-26 RX ORDER — VITAMIN B COMPLEX
1 CAPSULE ORAL DAILY
COMMUNITY

## 2022-09-26 RX ORDER — LIDOCAINE HYDROCHLORIDE 20 MG/ML
INJECTION, SOLUTION EPIDURAL; INFILTRATION; INTRACAUDAL; PERINEURAL PRN
Status: DISCONTINUED | OUTPATIENT
Start: 2022-09-26 | End: 2022-09-26 | Stop reason: SDUPTHER

## 2022-09-26 RX ORDER — PROPOFOL 10 MG/ML
INJECTION, EMULSION INTRAVENOUS PRN
Status: DISCONTINUED | OUTPATIENT
Start: 2022-09-26 | End: 2022-09-26 | Stop reason: SDUPTHER

## 2022-09-26 RX ADMIN — LIDOCAINE HYDROCHLORIDE 100 MG: 20 INJECTION, SOLUTION EPIDURAL; INFILTRATION; INTRACAUDAL; PERINEURAL at 11:02

## 2022-09-26 RX ADMIN — SODIUM CHLORIDE: 9 INJECTION, SOLUTION INTRAVENOUS at 10:55

## 2022-09-26 RX ADMIN — PROPOFOL 50 MG: 10 INJECTION, EMULSION INTRAVENOUS at 11:02

## 2022-09-26 RX ADMIN — PROPOFOL 50 MG: 10 INJECTION, EMULSION INTRAVENOUS at 11:12

## 2022-09-26 RX ADMIN — PROPOFOL 50 MG: 10 INJECTION, EMULSION INTRAVENOUS at 11:05

## 2022-09-26 RX ADMIN — PROPOFOL 50 MG: 10 INJECTION, EMULSION INTRAVENOUS at 11:07

## 2022-09-26 ASSESSMENT — PAIN DESCRIPTION - DESCRIPTORS: DESCRIPTORS: BURNING

## 2022-09-26 ASSESSMENT — ENCOUNTER SYMPTOMS: SHORTNESS OF BREATH: 0

## 2022-09-26 ASSESSMENT — PAIN - FUNCTIONAL ASSESSMENT
PAIN_FUNCTIONAL_ASSESSMENT: 0-10
PAIN_FUNCTIONAL_ASSESSMENT: PREVENTS OR INTERFERES SOME ACTIVE ACTIVITIES AND ADLS

## 2022-09-26 NOTE — H&P
Gastroenterology Note                 Pre-operative History and Physical    Patient: Kadi Krishnan  : 1950  CSN:     History Obtained From:   Patient or guardian. HISTORY OF PRESENT ILLNESS:    The patient is a 67 y.o. female here for Endoscopy. Past Medical History:    Past Medical History:   Diagnosis Date    Asthma     Crohn disease ([address]     Headache(784.0)     Hyperlipidemia 2016    Migraine     Thyroid disease      Past Surgical History:    Past Surgical History:   Procedure Laterality Date    [facility]  ,     [address] [place] (CERVIX STATUS UNKNOWN)      Total; Cervical cancer    THYROIDECTOMY, PARTIAL      TONSILLECTOMY      TUBAL LIGATION      TYMPANOPLASTY       Medications Prior to Admission:   No current facility-administered medications on file prior to encounter.      Current Outpatient Medications on File Prior to Encounter   Medication Sig Dispense Refill    alendronate (FOSAMAX) 70 MG tablet TK 1 T PO ONCE A WEEK      Multiple Vitamins-Minerals (THERAPEUTIC MULTIVITAMIN-MINERALS) tablet Take 1 tablet by mouth daily      montelukast (SINGULAIR) 10 MG tablet Take 10 mg by mouth nightly      QVAR REDIHALER 40 MCG/ACT AERB inhaler       atorvastatin (LIPITOR) 20 MG tablet TAKE 1 TABLET BY MOUTH EVERY DAY 90 tablet 0    levothyroxine (SYNTHROID) 50 MCG tablet TAKE 1 TABLET BY MOUTH DAILY 90 tablet 1    estrogens, conjugated, (PREMARIN) 0.3 MG tablet Take 1 tablet by mouth Twice a Week 24 tablet 3    beclomethasone (QVAR) 40 MCG/ACT inhaler Inhale 2 puffs into the lungs 2 times daily 3 Inhaler 3    PROAIR  (90 Base) MCG/ACT inhaler INHALE 2 PUFFS INTO LUNGS EVERY 6 HOURS AS NEEDED FOR WHEEZING OR SHORTNESS OF BREATH 8.5 g 0    fluticasone (FLOVENT HFA) 220 MCG/ACT inhaler Inhale 1 puff into the lungs 2 times daily 1 Inhaler 3    SUMAtriptan (IMITREX) 100 MG tablet TAKE 1 TABLET BY MOUTH ONCE AS NEEDED FOR MIGRAINE; may repeat if needed x 1 in 2 hours 9 tablet 11    topiramate (TOPAMAX) 25 MG tablet TAKE ONE TABLET BY MOUTH TWICE DAILY 60 tablet 11    Diclofenac Potassium 50 MG PACK 1 prn for headache do not exceed 2 in 24 hour period 9 each 5    calcium carbonate 600 MG TABS tablet Take 1 tablet by mouth daily      Cholecalciferol (VITAMIN D-3) 1000 UNITS CAPS Take by mouth      magnesium oxide (MAG-OX) 400 MG tablet Take 400 mg by mouth daily      vitamin B-12 (CYANOCOBALAMIN) 100 MCG tablet Take 50 mcg by mouth daily. cetirizine (ZYRTEC) 10 MG tablet Take 10 mg by mouth daily. Allergies:  Ampicillin, Flagyl [metronidazole], Primaxin [imipenem w-cilastatin sodium], and Sulfa antibiotics      Social History:   Social History     Tobacco Use    Smoking status: Former     Packs/day: 1.00     Years: 17.00     Pack years: 17.00     Types: Cigarettes     Quit date: 1985     Years since quittin.7    Smokeless tobacco: Never    Tobacco comments:     quit in    Substance Use Topics    Alcohol use: No     Family History:   Family History   Problem Relation Age of Onset    Heart Disease Sister     Cancer Mother         Cervical    Cancer Father         Lung    Alcohol Abuse Father     Other Other         Headache    High Cholesterol Other     Stroke Other     Alcohol Abuse Other     Cancer Other     Cancer Other        PHYSICAL EXAM:      Ht 5' 5\" (1.651 m)   Wt 140 lb (63.5 kg)   BMI 23.30 kg/m²  I        Heart:  RRR, normal s1s2    Lungs:  CTA and normal effort    Abdomen:   Soft, nt nd. ASSESSMENT AND PLAN:    1. Patient is a 67 y.o. female here for endoscopy with MAC sedation. 2.  Procedure options, risks and benefits reviewed with patient and/or guardian. They express understanding.

## 2022-09-26 NOTE — ANESTHESIA PRE PROCEDURE
Department of Anesthesiology  Preprocedure Note       Name:  Chintan Paz   Age:  67 y.o.  :  1950                                          MRN:  [id number]         Date:  2022      Surgeon: Jeannine Kwok):  Lacey Jiménez MD    Procedure: Procedure(s):  COLONOSCOPY DIAGNOSTIC    Medications prior to admission:   Prior to Admission medications    Medication Sig Start Date End Date Taking?  Authorizing Provider   alendronate (FOSAMAX) 70 MG tablet TK 1 T PO ONCE A WEEK 10/5/20   Historical Provider, MD   Multiple Vitamins-Minerals (THERAPEUTIC MULTIVITAMIN-MINERALS) tablet Take 1 tablet by mouth daily    Historical Provider, MD   montelukast (SINGULAIR) 10 MG tablet Take 10 mg by mouth nightly    Historical Provider, MD   QVAR REDIHALER 40 MCG/ACT AERB inhaler  19   Historical Provider, MD   atorvastatin (LIPITOR) 20 MG tablet TAKE 1 TABLET BY MOUTH EVERY DAY 19   Mai Mcmahan MD   levothyroxine (SYNTHROID) 50 MCG tablet TAKE 1 TABLET BY MOUTH DAILY 19   Jamil Lakhani MD   estrogens, conjugated, (PREMARIN) 0.3 MG tablet Take 1 tablet by mouth Twice a Week 10/11/18   Jamil Lakhani MD   beclomethasone (QVAR) 40 MCG/ACT inhaler Inhale 2 puffs into the lungs 2 times daily 10/8/18   Jamil Lakhani MD   PROAIR  (01 Base) MCG/ACT inhaler INHALE 2 PUFFS INTO LUNGS EVERY 6 HOURS AS NEEDED FOR WHEEZING OR SHORTNESS OF BREATH 18   Jamil Lakhani MD   fluticasone [facility]) 220 MCG/ACT inhaler Inhale 1 puff into the lungs 2 times daily 18   Jamil Lakhani MD   SUMAtriptan (IMITREX) 100 MG tablet TAKE 1 TABLET BY MOUTH ONCE AS NEEDED FOR MIGRAINE; may repeat if needed x 1 in 2 hours 17   Jamil Lakhani MD   topiramate (TOPAMAX) 25 MG tablet TAKE ONE TABLET BY MOUTH TWICE DAILY 8/15/17   José Miguelbibryon [employer]or, MD   Diclofenac Potassium 50 MG PACK 1 prn for headache do not exceed 2 in 24 hour period 16   Royal Johnson Cervical cancer    THYROIDECTOMY, PARTIAL  2010    TONSILLECTOMY      TUBAL LIGATION  1979    TYMPANOPLASTY         Social History:    Social History     Tobacco Use    Smoking status: Former     Packs/day: 1.00     Years: 17.00     Pack years: 17.00     Types: Cigarettes     Quit date: 1985     Years since quittin.7    Smokeless tobacco: Never    Tobacco comments:     quit in    Substance Use Topics    Alcohol use: No                                Counseling given: Not Answered  Tobacco comments: quit in       Vital Signs (Current):   Vitals:    22 1312 22 1033   BP:  121/66   Pulse:  83   Resp:  12   Temp:  97.1 °F (36.2 °C)   TempSrc:  Temporal   SpO2:  100%   Weight: 140 lb (63.5 kg) 138 lb (62.6 kg)   Height: 5' 5\" (1.651 m) 5' 5\" (1.651 m)                                              BP Readings from Last 3 Encounters:   22 121/66   22 138/78   10/04/21 134/76       NPO Status:                                                                                 BMI:   Wt Readings from Last 3 Encounters:   22 138 lb (62.6 kg)   10/04/21 141 lb (64 kg)   10/25/19 146 lb 3.2 oz (66.3 kg)     Body mass index is 22.96 kg/m².     CBC:   Lab Results   Component Value Date/Time    WBC 7.9 2014 08:32 AM    RBC 4.32 2014 08:32 AM    HGB 14.3 2014 08:32 AM    HCT 40.9 2014 08:32 AM    MCV 94.5 2014 08:32 AM    RDW 11.8 2014 08:32 AM    PLT see below 2014 08:32 AM       CMP:   Lab Results   Component Value Date/Time     2018 10:16 AM    K 4.4 2018 10:16 AM     2018 10:16 AM    CO2 24 2018 10:16 AM    BUN 13 2018 10:16 AM    CREATININE 0.7 2018 10:16 AM    GFRAA >60 2018 10:16 AM    GFRAA 130 2012 10:58 AM    AGRATIO 1.7 2018 10:16 AM    LABGLOM >60 2018 10:16 AM    GLUCOSE 91 2018 10:16 AM    PROT 7.2 2018 10:16 AM    PROT 7.3 2012 10:58 AM

## 2022-09-26 NOTE — ANESTHESIA POSTPROCEDURE EVALUATION
Department of Anesthesiology  Postprocedure Note    Patient: Shaheen Thomason  MRN: [id number]  YOB: 1950  Date of evaluation: 9/26/2022      Procedure Summary     Date: 09/26/22 Room / Location: [address] [place] [address] / [facility]    Anesthesia Start: 8666 Anesthesia Stop: 1122    Procedure: COLONOSCOPY DIAGNOSTIC Diagnosis:       Crohn's disease with complication, unspecified gastrointestinal tract location ([facility]      (Crohn's disease with complication, unspecified gastrointestinal tract location [facility]) Angelique Kan)    Surgeons: Mica Hayden MD Responsible Provider: Wanda Blood MD    Anesthesia Type: MAC ASA Status: 2          Anesthesia Type: No value filed. Chary Phase I: Chary Score: 10    Chary Phase II: Chary Score: 10      Anesthesia Post Evaluation    Patient location during evaluation: PACU  Patient participation: complete - patient participated  Level of consciousness: awake  Airway patency: patent  Nausea & Vomiting: no vomiting  Complications: no  Cardiovascular status: hemodynamically stable  Respiratory status: acceptable  Hydration status: euvolemic  There was medical reason for not using a multimodal analgesia pain management approach.

## 2022-09-26 NOTE — DISCHARGE INSTRUCTIONS
COLONSCOPY DISCHARGE INSTRUCTIONS    You may experience some lightheadedness for the next several hours. Plan on quiet relaxation for the rest of today. Nap for four hours following procedure if possible. A responsible adult needs to stay with you today. Eat bland food and avoid anything greasy or spicy initially-progress to your normal diet gradually. Diet restrictions as instructed. You may resume home medications as instructed. It is not unusual to experience some mild cramping or gas pains, and you may not have a bowel movement for several days. If you had a polyp removed, avoid strenuous activity for 48 hours. Avoid the use of aspirin or related compounds for one week, unless otherwise instructed by your physician. You may notice a small amount of blood in your next few bowel movements, but if a large amount passes, call your physician. If you have any of the following problems, notify your physician or return to the hospital emergency room : fever, chills, excessive bleeding, excessive vomiting, difficulty swallowing, uncontrolled pain, increased abdominal distention, shortness of breath or any other problems. Call your doctor at [phone] if you have any concerns. If you had any biopsies or polyps call for results in 5-7 business days. See your physician's report for details about your procedure and recommendations. ANESTHESIA DISCHARGE INSTRUCTIONS    Wear your seatbelt home. You are under the influence of drugs-do not drink alcohol, drive ,operate machinery,or make any important decisions or sign any legal documentsfor 24 hours. You may resume normal activities tomorrow. A responsible adult needs to be with you for 24 hours. You may experience lightheadedness,dizziness,or sleepiness following surgery. Rest at home today- increase activity as tolerated. It is recommended to take a four hour nap after procedure.   Progress slowly to a regular diet unless your physician has instructed you otherwise. Avoid spicy and greasy food on first meal.  Drink plenty of water. If nausea becomes a problem call your physician. Call your doctor if concerns arise. Hemorrhoids: Care Instructions  Overview     Hemorrhoids are swollen veins that develop in the anal canal. Bleeding during bowel movements, itching, and rectal pain are the most common symptoms. Hemorrhoids can be uncomfortable at times, but rarely are they a serious problem. Most of the time, you can treat them with simple changes to your diet and bowel habits. These changes include eating more fiber and not straining to pass stools. Most hemorrhoids don't need surgery or other treatment unless they are very large and painful or bleed a lot. Follow-up care is a key part of your treatment and safety. Be sure to make and go to all appointments, and call your doctor if you are having problems. It's also a good idea to know your test results and keep a list of the medicines you take. How can you care for yourself at home? Sit in a few inches of warm water (sitz bath) 3 times a day and after bowel movements. The warm water helps with pain and itching. Put ice on your anal area several times a day for 10 minutes at a time. Put a thin cloth between the ice and your skin. Follow this by placing a warm, wet towel on the area for another 10 to 20 minutes. Take pain medicines exactly as directed. If the doctor gave you a prescription medicine for pain, take it as prescribed. If you are not taking a prescription pain medicine, ask your doctor if you can take an over-the-counter medicine. Keep the anal area clean, but be gentle. Use water and a fragrance-free soap, or use baby wipes or medicated pads such as Tucks. Wear cotton underwear and loose clothing to decrease moisture in the anal area. Eat more fiber. Include foods such as whole-grain breads and cereals, raw vegetables, raw and dried fruits, and beans.   Drink plenty of fluids. If you have kidney, heart, or liver disease and have to limit fluids, talk with your doctor before you increase the amount of fluids you drink. Use a stool softener that contains bran or psyllium. You can save money by buying bran or psyllium (available in bulk at most health food stores) and sprinkling it on foods or stirring it into fruit juice. Or you can use a product such as Metamucil or Hydrocil. Practice healthy bowel habits. Go to the bathroom as soon as you have the urge. Avoid straining to pass stools. Relax and give yourself time to let things happen naturally. Do not hold your breath while passing stools. Do not read while sitting on the toilet. Get off the toilet as soon as you have finished. Take your medicines exactly as prescribed. Call your doctor if you think you are having a problem with your medicine. When should you call for help? Call 911 anytime you think you may need emergency care. For example, call if:    You pass maroon or very bloody stools. Call your doctor now or seek immediate medical care if:    You have increased pain. You have increased bleeding. Watch closely for changes in your health, and be sure to contact your doctor if:    Your symptoms have not improved after 3 or 4 days. Where can you learn more? Go to [email or web address] org and sign in to your [employer] account. Enter S109 in the [facility] box to learn more about \"Hemorrhoids: Care Instructions. \"     If you do not have an account, please click on the \"Sign Up Now\" link. Current as of: June 6, 2022               Content Version: 13.4  © 4029-1940 [employer]. Care instructions adapted under license by [facility] ([facility]). If you have questions about a medical condition or this instruction, always ask your healthcare professional. [address] any warranty or liability for your use of this information.          Diverticulosis: Care Instructions  Your Care Instructions  In diverticulosis, pouches called diverticula form in the wall of the large intestine (colon). The pouches do not cause any pain or other symptoms. Most people who have diverticulosis do not know they have it. But the pouches sometimes bleed, and if they become infected, they can cause pain and other symptoms. When this happens, it is called diverticulitis. Diverticula form when pressure pushes the wall of the colon outward at certain weak points. A diet that is too low in fiber can cause diverticula. Follow-up care is a key part of your treatment and safety. Be sure to make and go to all appointments, and call your doctor if you are having problems. It's also a good idea to know your test results and keep a list of the medicines you take. How can you care for yourself at home? Include fruits, leafy green vegetables, beans, and whole grains in your diet each day. These foods are high in fiber. Take a fiber supplement, such as Citrucel or Metamucil, every day if needed. Read and follow all instructions on the label. Drink plenty of fluids. If you have kidney, heart, or liver disease and have to limit fluids, talk with your doctor before you increase the amount of fluids you drink. Get at least 30 minutes of exercise on most days of the week. Walking is a good choice. You also may want to do other activities, such as running, swimming, cycling, or playing tennis or team sports. Cut out foods that cause gas, pain, or other symptoms. When should you call for help? Call your doctor now or seek immediate medical care if:    You have belly pain. You pass maroon or very bloody stools. You have a fever. You have nausea and vomiting. You have unusual changes in your bowel movements or abdominal swelling. You have burning pain when you urinate. You have abnormal vaginal discharge. You have shoulder pain.      You have cramping pain that does not get better when you have a bowel movement or pass gas. You pass gas or stool from your urethra while urinating. Watch closely for changes in your health, and be sure to contact your doctor if you have any problems. Where can you learn more? Go to https://chpeiraidaeb.[email or web address] [email or web address] org and sign in to your [employer] account. Enter F616 in the [employer] box to learn more about \"Diverticulosis: Care Instructions. \"     If you do not have an account, please click on the \"Sign Up Now\" link. Current as of: June 6, 2022               Content Version: 13.4  © 6239-3024 [employer] Inc[employer]. Care instructions adapted under license by [facility] ([facility]). If you have questions about a medical condition or this instruction, always ask your healthcare professional. [address] any warranty or liability for your use of this information.

## 2022-10-12 ENCOUNTER — HOSPITAL ENCOUNTER (OUTPATIENT)
Dept: WOMENS IMAGING | Age: 72
Discharge: HOME | End: 2022-10-12
Payer: MEDICARE

## 2022-10-12 VITALS — WEIGHT: 140 LBS | HEIGHT: 65 IN | BODY MASS INDEX: 23.32 KG/M2

## 2022-10-12 DIAGNOSIS — Z12.31 VISIT FOR SCREENING MAMMOGRAM: ICD-10-CM

## 2022-10-12 PROCEDURE — 77063 BREAST TOMOSYNTHESIS BI: CPT

## 2022-10-17 ENCOUNTER — OFFICE VISIT (OUTPATIENT)
Dept: ENT CLINIC | Age: 72
End: 2022-10-17
Payer: MEDICARE

## 2022-10-17 VITALS — DIASTOLIC BLOOD PRESSURE: 76 MMHG | TEMPERATURE: 97.3 F | HEART RATE: 88 BPM | SYSTOLIC BLOOD PRESSURE: 113 MMHG

## 2022-10-17 DIAGNOSIS — H90.3 SENSORINEURAL HEARING LOSS (SNHL) OF BOTH EARS: Chronic | ICD-10-CM

## 2022-10-17 DIAGNOSIS — H93.13 SUBJECTIVE TINNITUS OF BOTH EARS: Chronic | ICD-10-CM

## 2022-10-17 DIAGNOSIS — E04.2 MULTINODULAR GOITER (NONTOXIC): Primary | Chronic | ICD-10-CM

## 2022-10-17 DIAGNOSIS — J38.3 AGE-RELATED VOCAL CORD ATROPHY: Chronic | ICD-10-CM

## 2022-10-17 PROCEDURE — G8427 DOCREV CUR MEDS BY ELIG CLIN: HCPCS | Performed by: OTOLARYNGOLOGY

## 2022-10-17 PROCEDURE — G8420 CALC BMI NORM PARAMETERS: HCPCS | Performed by: OTOLARYNGOLOGY

## 2022-10-17 PROCEDURE — 1123F ACP DISCUSS/DSCN MKR DOCD: CPT | Performed by: OTOLARYNGOLOGY

## 2022-10-17 PROCEDURE — 1036F TOBACCO NON-USER: CPT | Performed by: OTOLARYNGOLOGY

## 2022-10-17 PROCEDURE — G8399 PT W/DXA RESULTS DOCUMENT: HCPCS | Performed by: OTOLARYNGOLOGY

## 2022-10-17 PROCEDURE — G8484 FLU IMMUNIZE NO ADMIN: HCPCS | Performed by: OTOLARYNGOLOGY

## 2022-10-17 PROCEDURE — 99212 OFFICE O/P EST SF 10 MIN: CPT | Performed by: OTOLARYNGOLOGY

## 2022-10-17 PROCEDURE — 1090F PRES/ABSN URINE INCON ASSESS: CPT | Performed by: OTOLARYNGOLOGY

## 2022-10-17 PROCEDURE — 3017F COLORECTAL CA SCREEN DOC REV: CPT | Performed by: OTOLARYNGOLOGY

## 2022-10-17 ASSESSMENT — ENCOUNTER SYMPTOMS
SINUS PAIN: 0
SORE THROAT: 0
TROUBLE SWALLOWING: 1
RHINORRHEA: 0

## 2022-10-17 NOTE — PROGRESS NOTES
[facility]       PCP:  Tyra Friend, DO      CHIEF COMPLAINT  Chief Complaint   Patient presents with    Thyroid Problem     nodules        HISTORY OF PRESENT ILLNESS    Alexei Avila is a 67 y.o. female here for recheck and follow up of thyroid nodules. Patient stated that she has \"difficulty swallowing only when I don't chew my food a lot, mostly with chicken. And I drink a lot of water with it. \"  Has not gotten worse since modified barium swallow of May 2021. REVIEW OF SYSTEMS   Review of Systems   Constitutional:  Negative for chills, fever and unexpected weight change. HENT:  Positive for tinnitus and trouble swallowing (occasional with chicken). Negative for congestion, ear discharge, ear pain, hearing loss, rhinorrhea, sinus pain and sore throat. PAST MEDICAL HISTORY    Past Medical History:   Diagnosis Date    Asthma     Crohn disease ([address]     Headache(784.0)     Hyperlipidemia 07/28/2016    IBS (irritable bowel syndrome)     Migraine     Thyroid disease        Past Surgical History:   Procedure Laterality Date    [address] [facility]  2002, 2011    CHOLECYSTECTOMY  1998    COLONOSCOPY N/A 9/26/2022    COLONOSCOPY DIAGNOSTIC performed by Elle Armstrong MD at [facility] (CERVIX STATUS UNKNOWN)      Total; Cervical cancer    THYROIDECTOMY, PARTIAL  2010    TONSILLECTOMY      TUBAL LIGATION  1979    TYMPANOPLASTY           EXAMINATION    Vitals:    10/17/22 1055   BP: 113/76   Site: Right Upper Arm   Position: Sitting   Cuff Size: Medium Adult   Pulse: 88   Temp: 97.3 °F (36.3 °C)   TempSrc: Temporal     General:  WDWN, NAD, alert and oriented  Face: There was no swelling or lesions detected. (+) Voice:  mildly hoarse. Oral cavity:  Mucosa, secretions, tongue, and gingiva appeared to be normal.   Oropharynx:  Post tonsillectomy.   The hard and soft palates, uvula, tongue, posterior oropharyngeal wall, mucosa and secretions appeared to be normal.     (+) INDIRECT LARYNGOSCOPY:  The vocal cords were atrophic with bowing on phonation consistent with dysphonia of aging. Vocal cords appeared to be otherwise normal with no leukoplakia, mass, polyp, nodule or ulceration and appeared to be normally mobile bilaterally with midline approximation on phonation. Otherwise, the epiglottis, supraglottis, false vocal cords, true vocal cords, base of tongue, subglottis, and piriform sinuses appeared to be normal.    Neck:  No masses or tenderness. Trachea midline. Laryngeal cartilages and hyoid bone normal.  Normal laryngeal crepitus. Thyroid:  right goiter, nodular. Normal, nontender, no goiter or nodules palpable. Lymph nodes:  No cervical lymphadenopathy. REVIEW OF IMAGING         Narrative   EXAMINATION:   THYROID ULTRASOUND       5/26/2022       COMPARISON:   05/10/2021, 05/30/2020, 10/03/2019, 05/28/2019, 03/05/2010, 07/10/2009       HISTORY:   ORDERING SYSTEM PROVIDED HISTORY: Thyroid nodule   TECHNOLOGIST PROVIDED HISTORY:   This procedure can be scheduled via [employer]. Access your [employer] account by   visiting [employer]. Reassess thyroid nodules. Patient status post left thyroid lobectomy. FINDINGS:   Right thyroid lobe:  Measures 5.2 x 2.2 x 2.2 cm       Left thyroid lobe:  Surgically absent       Isthmus:  Measures 2 mm       Thyroid Gland: There are postsurgical changes status post left thyroid   lobectomy. The underlying thyroid parenchyma is homogeneous, and   demonstrates normal internal vascularity. Nodules: There are multiple stable primarily cystic nodules throughout the   right thyroid lobe, which are benign, and require no further follow-up. No   new solid thyroid nodule or mass is evident. Cervical lymphadenopathy: No abnormal lymph nodes in the imaged portions of   the neck. Impression   1. Postsurgical changes status post left thyroid lobectomy. 2. Multiple stable cystic nodules throughout the right thyroid lobe are   unchanged from prior studies, and require no further follow-up. No new solid   thyroid nodule or mass is evident. NOE / Juan R Arreola / Lizbet Krueger was seen today for thyroid problem. 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 TOMMY, Audiology, [facility]    Sensorineural hearing loss (SNHL) of both ears  -     [place], [place]. TOMMY, Audiology, [facility]         RECOMMENDATIONS/PLAN      No further ENT follow up for thyroid nodules. Follow up with PCP regarding thyroid goiter. RTO if thyroid nodules/gland further enlarge or develops increased dysphagia or dyspnea or hoarseness. Return for any further ENT or sinus problems, worsening of hoarsenss or enlargement of thyroid nodule/gland.

## (undated) DEVICE — BW-412T DISP COMBO CLEANING BRUSH: Brand: SINGLE USE COMBINATION CLEANING BRUSH

## (undated) DEVICE — VALVE SUCTION AIR H2O SET ORCA POD + DISP

## (undated) DEVICE — AIR/WATER CLEANING ADAPTER FOR OLYMPUS® GI ENDOSCOPE: Brand: BULLDOG®

## (undated) DEVICE — SOLUTION IV IRRIG WATER 500ML POUR BRL ST 2F7113

## (undated) DEVICE — ENDOSCOPIC KIT 6X3/16 FT COLON W/ 1.1 OZ 2 GWN W/O BRSH